# Patient Record
Sex: MALE | Race: WHITE | Employment: FULL TIME | URBAN - METROPOLITAN AREA
[De-identification: names, ages, dates, MRNs, and addresses within clinical notes are randomized per-mention and may not be internally consistent; named-entity substitution may affect disease eponyms.]

---

## 2019-05-06 ENCOUNTER — TELEPHONE (OUTPATIENT)
Dept: PRIMARY CARE CLINIC | Age: 24
End: 2019-05-06

## 2019-05-30 ENCOUNTER — OFFICE VISIT (OUTPATIENT)
Dept: FAMILY MEDICINE CLINIC | Age: 24
End: 2019-05-30
Payer: COMMERCIAL

## 2019-05-30 VITALS
RESPIRATION RATE: 22 BRPM | TEMPERATURE: 98.8 F | WEIGHT: 253 LBS | HEART RATE: 88 BPM | BODY MASS INDEX: 34.27 KG/M2 | HEIGHT: 72 IN

## 2019-05-30 DIAGNOSIS — H66.002 ACUTE SUPPURATIVE OTITIS MEDIA OF LEFT EAR WITHOUT SPONTANEOUS RUPTURE OF TYMPANIC MEMBRANE, RECURRENCE NOT SPECIFIED: Primary | ICD-10-CM

## 2019-05-30 PROCEDURE — 99213 OFFICE O/P EST LOW 20 MIN: CPT | Performed by: FAMILY MEDICINE

## 2019-05-30 RX ORDER — CLARITHROMYCIN 500 MG/1
500 TABLET, COATED ORAL 2 TIMES DAILY
Qty: 14 TABLET | Refills: 0 | Status: SHIPPED | OUTPATIENT
Start: 2019-05-30 | End: 2019-06-06

## 2019-05-30 NOTE — PROGRESS NOTES
19  Italo Lopez : 1995 Sex: male  Age: 21 y.o. Chief Complaint   Patient presents with    Sinusitis     x1week    Congestion     x1week    Cough     x1week       HPI: : URI  The patient states that they have had rhinorrhea, cough, congestion for the last 1 days. Patient is non-verbal. Has not been complaining of any pain. Patient has been more irritable at night. Cough is productive with sputum. The patient also reports nasal congestion and mild sore throat. Positive for nasal discharge. The patient has had general malaise. No fever or chills but has felt warm at times. Patient does admit to chest discomfort with coughing. No dyspnea. No vomiting or diarrhea. The patient presents for evaluation. ROS:  Const: Positives and pertinent negatives as per HPI. All others reviewed and are negative.         Current Outpatient Medications:     clarithromycin (BIAXIN) 500 MG tablet, Take 1 tablet by mouth 2 times daily for 7 days, Disp: 14 tablet, Rfl: 0    acetaminophen (APAP EXTRA STRENGTH) 500 MG tablet, Take 2 tablets by mouth every 6 hours as needed for Pain, Disp: 120 tablet, Rfl: 3    fluticasone (FLONASE) 50 MCG/ACT nasal spray, 1 spray by Nasal route as needed for Rhinitis, Disp: , Rfl:     Multiple Vitamins-Minerals (THERAPEUTIC MULTIVITAMIN-MINERALS) tablet, Take 1 tablet by mouth daily Last dose 2017, Disp: , Rfl:     cetirizine (ZYRTEC) 10 MG tablet, Take 10 mg by mouth daily, Disp: , Rfl:   Allergies   Allergen Reactions    Cefzil [Cefprozil]     Dust Mite Extract     Lac Bovis     Lactose Intolerance (Gi)     Strattera  [Atomoxetine Hcl]        Past Medical History:   Diagnosis Date    Autism     Cerumen impaction     PONV (postoperative nausea and vomiting)      Past Surgical History:   Procedure Laterality Date    EAR EXAM UNDER GENERAL ANESTHESIA Bilateral 2017    evaluation with cerumen removal    MYRINGOTOMY AND TYMPANOSTOMY TUBE PLACEMENT No family history on file. Social History     Socioeconomic History    Marital status: Single     Spouse name: Not on file    Number of children: Not on file    Years of education: Not on file    Highest education level: Not on file   Occupational History    Not on file   Social Needs    Financial resource strain: Not on file    Food insecurity:     Worry: Not on file     Inability: Not on file    Transportation needs:     Medical: Not on file     Non-medical: Not on file   Tobacco Use    Smoking status: Never Smoker   Substance and Sexual Activity    Alcohol use: No    Drug use: Not on file    Sexual activity: Not on file   Lifestyle    Physical activity:     Days per week: Not on file     Minutes per session: Not on file    Stress: Not on file   Relationships    Social connections:     Talks on phone: Not on file     Gets together: Not on file     Attends [employer] service: Not on file     Active member of club or organization: Not on file     Attends meetings of clubs or organizations: Not on file     Relationship status: Not on file    Intimate partner violence:     Fear of current or ex partner: Not on file     Emotionally abused: Not on file     Physically abused: Not on file     Forced sexual activity: Not on file   Other Topics Concern    Not on file   Social History Narrative    Not on file         Vitals:    05/30/19 0908   Pulse: 88   Resp: 22   Temp: 98.8 °F (37.1 °C)   Weight: 253 lb (114.8 kg)   Height: 6' (1.829 m)       Exam:  Physical Exam   Constitutional: He appears well-developed. HENT:   Head: Normocephalic. Right Ear: Tympanic membrane normal.   Left Ear: Tympanic membrane is injected and erythematous. A middle ear effusion is present. Nose: Mucosal edema and rhinorrhea present. Patient uncooperative and unable follow commands to open mouth. Eyes: Pupils are equal, round, and reactive to light.  Conjunctivae are normal.   Cardiovascular: Normal rate, regular rhythm and normal heart sounds. No murmur heard. Pulmonary/Chest: Effort normal and breath sounds normal. He has no wheezes. He has no rales. Abdominal: Soft. Bowel sounds are normal.   Lymphadenopathy:     He has no cervical adenopathy. Neurological: He is alert. Psychiatric: He has a normal mood and affect. Assessment and Plan:  Lori Savage was seen today for sinusitis, congestion and cough. Diagnoses and all orders for this visit:    Acute suppurative otitis media of left ear without spontaneous rupture of tympanic membrane, recurrence not specified    Other orders  -     clarithromycin (BIAXIN) 500 MG tablet; Take 1 tablet by mouth 2 times daily for 7 days      Return in about 1 week (around 6/6/2019), or w/PCP. The above treatment regimen was discussed at length and all questions in regards to such were answered. Antibiotics were reviewed including common side effects and rare side effects including but not limited to C. diff infection. Patient is to proceed to the emergency department with any worsening symptoms. Patient should follow-up with her family doctor within the next 3-5 days.     Seen By:   Tae Robbins MD

## 2019-09-23 RX ORDER — FLUTICASONE PROPIONATE 50 MCG
SPRAY, SUSPENSION (ML) NASAL
Qty: 1 BOTTLE | Refills: 3 | Status: SHIPPED
Start: 2019-09-23 | End: 2020-06-16 | Stop reason: SDUPTHER

## 2020-06-16 RX ORDER — FLUTICASONE PROPIONATE 50 MCG
SPRAY, SUSPENSION (ML) NASAL
Qty: 1 BOTTLE | Refills: 3 | Status: SHIPPED
Start: 2020-06-16 | End: 2021-03-23 | Stop reason: SDUPTHER

## 2021-03-23 ENCOUNTER — OFFICE VISIT (OUTPATIENT)
Dept: PRIMARY CARE CLINIC | Age: 26
End: 2021-03-23
Payer: COMMERCIAL

## 2021-03-23 VITALS — HEIGHT: 72 IN | RESPIRATION RATE: 18 BRPM | BODY MASS INDEX: 38.74 KG/M2 | WEIGHT: 286 LBS | TEMPERATURE: 97.2 F

## 2021-03-23 DIAGNOSIS — F84.0 ACTIVE AUTISTIC DISORDER: ICD-10-CM

## 2021-03-23 DIAGNOSIS — H61.23 BILATERAL IMPACTED CERUMEN: ICD-10-CM

## 2021-03-23 DIAGNOSIS — J31.0 CHRONIC RHINITIS: ICD-10-CM

## 2021-03-23 DIAGNOSIS — Z00.00 HEALTH MAINTENANCE EXAMINATION: Primary | ICD-10-CM

## 2021-03-23 PROCEDURE — 99395 PREV VISIT EST AGE 18-39: CPT | Performed by: FAMILY MEDICINE

## 2021-03-23 RX ORDER — FLUTICASONE PROPIONATE 50 MCG
SPRAY, SUSPENSION (ML) NASAL
Qty: 1 BOTTLE | Refills: 6 | Status: SHIPPED
Start: 2021-03-23 | End: 2022-04-05 | Stop reason: SDUPTHER

## 2021-03-23 ASSESSMENT — PATIENT HEALTH QUESTIONNAIRE - PHQ9: DEPRESSION UNABLE TO ASSESS: FUNCTIONAL CAPACITY MOTIVATION LIMITS ACCURACY

## 2021-03-23 NOTE — PROGRESS NOTES
3/23/21  Hannah Divers : 1995 Sex: male  Age: 22 y.o. Chief Complaint   Patient presents with    Annual Exam       HPI:   Rizwana Quijano presents today with his mom/guardian. In baseline state of health. Has forms in a completed. Chronic cerumen. Has seen ENT. Review of Systems negative in general, nonverbal  ROS:  Const: Denies chills, fever, malaise and sweats. Eyes: Denies discharge, pain, redness and visual disturbance. ENMT: Denies earaches, other ear symptoms. Denies nasal or sinus symptoms other than stated  above. Denies mouth and tongue lesions and sore throat. CV: Denies chest discomfort, pain; diaphoresis, dizziness, edema, lightheadedness, orthopnea,  palpitations, syncope and near syncopal episode or any exertional symptoms  Resp: Denies cough, hemoptysis, pleuritic pain, SOB, sputum production and wheezing. GI: Denies abdominal pain, change in bowel habits, hematochezia, melena, nausea and vomiting. : Denies urinary symptoms including dysuria , urgency, frequency or hematuria. Musculo: Denies musculoskeletal symptoms. Skin: Denies bruising and rash. Neuro: Denies headache, numbness, stiff neck, tingling and focal weakness slurred speech or facial  droop  Hema/Lymph: Denies bleeding/bruising tendency and enlarged lymph nodes      Health Maintenance:  Proper diet reviewed including Mediterranean and DASH diets. Counseled on healthy weight, appropriate exercise. Counseled on the potential pros and cons of vitamins and supplements. Reviewed the recommendations and risk/benefits of vaccines including Td/Tdap,  pneumovax,  prevnar 13 , flu vaccine, Hepatitis vaccines, gardasil, and shingrix (patient had chicken pox). Intolerant to the administration vaccines and so defers. HIV and Hep C screening guidelines were reviewed. Intolerant to blood work and so defers. Importance of regular eye and dental exams and health reviewed. Sun protection reviewed.  Notify if any new or changing moles/skin lesions, etc.       Indications for EKG and indications for additional  cardiac testing including referrals, stress testing,  2d echo, ect. dasx  Reviewed indications for other testing such as  PFT's.and  indications for imaging including brain, carotid, chest, abdominal, aortic .  dasx     Current Outpatient Medications:     fluticasone (FLONASE) 50 MCG/ACT nasal spray, 1 spray each nostril qd prn, Disp: 1 Bottle, Rfl: 6    acetaminophen (APAP EXTRA STRENGTH) 500 MG tablet, Take 2 tablets by mouth every 6 hours as needed for Pain, Disp: 120 tablet, Rfl: 3    Multiple Vitamins-Minerals (THERAPEUTIC MULTIVITAMIN-MINERALS) tablet, Take 1 tablet by mouth daily Last dose 8/14/2017, Disp: , Rfl:     cetirizine (ZYRTEC) 10 MG tablet, Take 10 mg by mouth daily, Disp: , Rfl:      Allergies   Allergen Reactions    Cefzil [Cefprozil]     Dust Mite Extract     Lac Bovis     Lactose Intolerance (Gi)     Strattera  [Atomoxetine Hcl]        Past Medical History:   Diagnosis Date    Autism     Cerumen impaction     PONV (postoperative nausea and vomiting)      Past Surgical History:   Procedure Laterality Date    EAR EXAM UNDER GENERAL ANESTHESIA Bilateral 08/24/2017    evaluation with cerumen removal    MYRINGOTOMY AND TYMPANOSTOMY TUBE PLACEMENT       No family history on file. Social History     Tobacco Use    Smoking status: Never Smoker    Smokeless tobacco: Never Used   Substance Use Topics    Alcohol use: No    Drug use: Not on file      Social History     Social History Narrative    PMH:    Medical Problems:    Autism - Has followed with Developmental specialists, Dr Carlos Shaw. Has tried a number of meds    including ritalin. Was part of experimental study for autism (vegetable enzyme) - pt allergic. Has    been to [facility]. Previously  ST/OT. Does not qualify for PT.  Nonverbal. Uses PEC (picture exchange    system)    PDD - pervasive developmental diagnosis    Surgical Hx:    tymp tubes - 4sets. Follows with Dr Ngozi Hidalgo. FH:    Father:    . (Hx)    Mother:    . (Hx)    Denies    SH:    . (Marital)Mom, Dad, Sister (3yrs older)    Personal Habits: Cigarette Use: Never Smoked Cigarettes. Vitals:    03/23/21 1510   BP: Comment: unable to get reading   Pulse: Comment: unable to get reading   Resp: 18   Temp: 97.2 °F (36.2 °C)   TempSrc: Temporal   SpO2: Comment: unable to get reading   Weight: 286 lb (129.7 kg)   Height: 6' (1.829 m)       Physical Exam  Exam:  Const: Appears comfortable. No signs of acute distress present. Head/Face: Atraumatic on inspection. Eyes: EOMI in both eyes. No discharge from the eyes. PERRL. Sclerae clear. ENMT: Auditory canals reveal mild bilateral cerumen tympanic membranes: intact and translucent. External nose WNL. Nasal mucosa is clear. Oropharynx: No erythema or exudate. Posterior pharynx is normal.  Neck: Supple. Palpation reveals no adenopathy. No masses appreciated. No JVD. Carotids: no  bruits. Resp: Respirations are unlabored. Clear to auscultation. No rales, rhonchi or wheezes appreciated  over the lungs bilaterally. CV: Rate is regular. Rhythm is regular. No gallop or rubs. No heart murmur appreciated. Extremities: No clubbing, cyanosis, or edema. No calf inflammation or tenderness. Abdomen: Bowel sounds are normoactive. Abdomen is soft, nontender, and nondistended. No  abdominal masses. No palpable hepatosplenomegaly. Genitaliadeferred  Lymph: No palpable or visible regional lymphadenopathy. Musculoskeletal: no acute joint inflammation. Skin: Dry and warm with no rash. Skin normal to inspection and palpation overall. Neuro: Grossly intact. Difficult to do involved exam    Office Labs This Visit :  No results found for this visit on 03/23/21. Assessment and Plan:   Diagnosis Orders   1. Health maintenance examination     2. Bilateral impacted cerumen     3. Active autistic disorder     4.  Chronic rhinitis  fluticasone (FLONASE) 50 MCG/ACT nasal spray          Health maintenance examination  Health maintenance issues discussed at length as above, 3/21. Encourage yearly    Active autistic disorder  History of. Nonverbal.  Needs guardianship. Mom doing an excellent job    Bilateral impacted cerumen  History of. Has seen Dr. Omar Simpson. We did discuss appropriate use of Debrox and bulb irrigation. R/B reviewed     Allergic rhinitis  Counseled, does well with Flonase 1 spray each nostril daily as needed and Zyrtec 10 mg daily as needed. Mom asking for refill on Flonase. Plan as above. Counseled extensively and differential diagnoses relevant to above were reviewed, including serious etiologies. Side effects and interactions of medications were reviewed. Counseled. Intolerant to administration of vaccines, blood work and so mom would like to stay conservative. Seems in baseline health. Forms completed. They will plan yearly follow-up for physical sooner as needed        As long as symptoms steadily improve/resolve, and medical conditions follow the expected course, FU as below, sooner PRN. Return in about 1 year (around 3/23/2022), or if symptoms worsen or fail to improve, for physical.        Signs and symptoms to watch for discussed, serious signs and symptoms reviewed. ER if any. Honey Zaman MD    Patients are advised to check with insurance company to ensure coverage and to fully understand benefits and cost prior to any testing. This note was created with the assistance of voice recognition software. Document was reviewed however may contain grammatical errors.

## 2021-03-23 NOTE — ASSESSMENT & PLAN NOTE
Counseled, does well with Flonase 1 spray each nostril daily as needed and Zyrtec 10 mg daily as needed. Mom asking for refill on Flonase.

## 2021-03-23 NOTE — ASSESSMENT & PLAN NOTE
History of. Has seen Dr. Velasco Come. We did discuss appropriate use of Debrox and bulb irrigation.   R/B reviewed

## 2021-04-22 PROBLEM — Z00.00 HEALTH MAINTENANCE EXAMINATION: Status: RESOLVED | Noted: 2021-03-23 | Resolved: 2021-04-22

## 2021-11-09 ENCOUNTER — TELEPHONE (OUTPATIENT)
Dept: PRIMARY CARE CLINIC | Age: 26
End: 2021-11-09

## 2021-12-08 ENCOUNTER — OFFICE VISIT (OUTPATIENT)
Dept: FAMILY MEDICINE CLINIC | Age: 26
End: 2021-12-08
Payer: COMMERCIAL

## 2021-12-08 VITALS
RESPIRATION RATE: 20 BRPM | HEART RATE: 121 BPM | WEIGHT: 286 LBS | OXYGEN SATURATION: 96 % | HEIGHT: 72 IN | BODY MASS INDEX: 38.74 KG/M2 | TEMPERATURE: 101.6 F

## 2021-12-08 DIAGNOSIS — U07.1 COVID-19 VIRUS DETECTED: Primary | ICD-10-CM

## 2021-12-08 LAB
Lab: ABNORMAL
QC PASS/FAIL: ABNORMAL
SARS-COV-2 RDRP RESP QL NAA+PROBE: POSITIVE

## 2021-12-08 PROCEDURE — 99213 OFFICE O/P EST LOW 20 MIN: CPT | Performed by: PHYSICIAN ASSISTANT

## 2021-12-08 PROCEDURE — 87635 SARS-COV-2 COVID-19 AMP PRB: CPT | Performed by: PHYSICIAN ASSISTANT

## 2021-12-08 RX ORDER — AZITHROMYCIN 250 MG/1
250 TABLET, FILM COATED ORAL SEE ADMIN INSTRUCTIONS
Qty: 6 TABLET | Refills: 0 | Status: SHIPPED | OUTPATIENT
Start: 2021-12-08 | End: 2021-12-13

## 2021-12-08 NOTE — PROGRESS NOTES
21  Misha Mckeon : 1995 Sex: male  Age 32 y.o. Subjective:  Chief Complaint   Patient presents with    Cough     covid         HPI:   Misha Mckeon , 32 y.o. male presents to [facility] care for evaluation of cough, Covid    HPI  63-year-old male presents to [facility] for evaluation of cough, fever, and at home positive Covid test.  Patient is here with mother and father. The patient is nonverbal.  The patient has not had the vaccine. The patient has had 1 or 2 times at home. The patient is not currently on any antibiotics. Patient has not had the vaccine nor booster. ROS:   Unless otherwise stated in this report the patient's positive and negative responses for review of systems for constitutional, eyes, ENT, cardiovascular, respiratory, gastrointestinal, neurological, , musculoskeletal, and integument systems and related systems to the presenting problem are either stated in the history of present illness or were not pertinent or were negative for the symptoms and/or complaints related to the presenting medical problem. Positives and pertinent negatives as per HPI. All others reviewed and are negative. PMH:     Past Medical History:   Diagnosis Date    Autism     Cerumen impaction     PONV (postoperative nausea and vomiting)        Past Surgical History:   Procedure Laterality Date    EAR EXAM UNDER GENERAL ANESTHESIA Bilateral 2017    evaluation with cerumen removal    MYRINGOTOMY AND TYMPANOSTOMY TUBE PLACEMENT         History reviewed. No pertinent family history.     Medications:     Current Outpatient Medications:     azithromycin (ZITHROMAX) 250 MG tablet, Take 1 tablet by mouth See Admin Instructions for 5 days 500mg on day 1 followed by 250mg on days 2 - 5, Disp: 6 tablet, Rfl: 0    fluticasone (FLONASE) 50 MCG/ACT nasal spray, 1 spray each nostril qd prn, Disp: 1 Bottle, Rfl: 6    acetaminophen (APAP EXTRA STRENGTH) 500 MG tablet, Take 2 Results for orders placed or performed in visit on 12/08/21   POCT COVID-19, Rapid   Result Value Ref Range    SARS-COV-2, RdRp gene Positive (A) Negative    Lot Number [id number]     QC Pass/Fail Pass          Medical Decision Making:     Vital signs reviewed    Past medical history reviewed. Allergies reviewed. Medications reviewed. Patient on arrival does not appear to be in any apparent distress or discomfort. The patient has been seen and evaluated. The patient does not appear to be toxic or lethargic. The patient did have a rapid Covid test that was positive. The patient will be treated with a azithromycin. They do not feel that the patient would tolerate the infusion therapy. COVID-19 was detected as positive. We will have the patient quarantine, isolate. Call with any questions or concerns. Return if any of the signs or symptoms change or worsen for further evaluation and possible imaging/chest x-ray. Continue with vitamin regimen, fluids, rest.  Use Motrin, Tylenol. Monitor pulse ox (less than 92% go to ED). Follow-up with PCP or return to [facility] for evaluation. If symptoms worsen go directly to the ED    The patient was educated on the proper dosage of motrin and tylenol and the appropriate intervals of each. The patient is to increase fluid intake over the next several days. The patient is to use OTC decongestant as needed. The patient is to return to express care or go directly to the emergency department should any of the signs or symptoms worsen. The patient is to followup with primary care physician in 2-3 days for repeat evaluation. The patient has no other questions or concerns at this time the patient will be discharged home. Clinical Impression:   Leigh Fraser was seen today for cough. Diagnoses and all orders for this visit:    COVID-19 virus detected  -     POCT COVID-19, Rapid  -     azithromycin (ZITHROMAX) 250 MG tablet;  Take 1 tablet by mouth See Admin Instructions for 5 days 500mg on day 1 followed by 250mg on days 2 - 5        The patient is to call for any concerns or return if any of the signs or symptoms worsen. The patient is to follow-up with PCP in the next 2-3 days for repeat evaluation repeat assessment or go directly to the emergency department.      SIGNATURE: Alban Cooper III, PA-C

## 2021-12-13 ENCOUNTER — TELEPHONE (OUTPATIENT)
Dept: PRIMARY CARE CLINIC | Age: 26
End: 2021-12-13

## 2021-12-13 ENCOUNTER — APPOINTMENT (OUTPATIENT)
Dept: GENERAL RADIOLOGY | Age: 26
End: 2021-12-13
Payer: COMMERCIAL

## 2021-12-13 ENCOUNTER — HOSPITAL ENCOUNTER (EMERGENCY)
Age: 26
Discharge: HOME | End: 2021-12-13
Attending: EMERGENCY MEDICINE
Payer: COMMERCIAL

## 2021-12-13 VITALS
DIASTOLIC BLOOD PRESSURE: 82 MMHG | WEIGHT: 260 LBS | SYSTOLIC BLOOD PRESSURE: 140 MMHG | HEART RATE: 115 BPM | BODY MASS INDEX: 35.21 KG/M2 | HEIGHT: 72 IN | OXYGEN SATURATION: 93 % | TEMPERATURE: 100 F | RESPIRATION RATE: 20 BRPM

## 2021-12-13 DIAGNOSIS — U07.1 COVID-19: Primary | ICD-10-CM

## 2021-12-13 DIAGNOSIS — F84.0 ACTIVE AUTISTIC DISORDER: ICD-10-CM

## 2021-12-13 LAB
EKG ATRIAL RATE: 111 BPM
EKG P-R INTERVAL: 136 MS
EKG Q-T INTERVAL: 292 MS
EKG QRS DURATION: 74 MS
EKG QTC CALCULATION (BAZETT): 397 MS
EKG R AXIS: -9 DEGREES
EKG T AXIS: -17 DEGREES
EKG VENTRICULAR RATE: 111 BPM

## 2021-12-13 PROCEDURE — 6370000000 HC RX 637 (ALT 250 FOR IP): Performed by: EMERGENCY MEDICINE

## 2021-12-13 PROCEDURE — 71046 X-RAY EXAM CHEST 2 VIEWS: CPT

## 2021-12-13 PROCEDURE — 93010 ELECTROCARDIOGRAM REPORT: CPT | Performed by: INTERNAL MEDICINE

## 2021-12-13 PROCEDURE — 93005 ELECTROCARDIOGRAM TRACING: CPT | Performed by: PHYSICIAN ASSISTANT

## 2021-12-13 PROCEDURE — 6370000000 HC RX 637 (ALT 250 FOR IP): Performed by: PHYSICIAN ASSISTANT

## 2021-12-13 PROCEDURE — 6360000002 HC RX W HCPCS: Performed by: EMERGENCY MEDICINE

## 2021-12-13 PROCEDURE — 99284 EMERGENCY DEPT VISIT MOD MDM: CPT

## 2021-12-13 RX ORDER — DIAZEPAM 5 MG/1
5 TABLET ORAL EVERY 6 HOURS PRN
Qty: 2 TABLET | Refills: 0 | Status: SHIPPED | OUTPATIENT
Start: 2021-12-13 | End: 2021-12-15

## 2021-12-13 RX ORDER — DEXAMETHASONE SODIUM PHOSPHATE 10 MG/ML
10 INJECTION INTRAMUSCULAR; INTRAVENOUS ONCE
Status: COMPLETED | OUTPATIENT
Start: 2021-12-13 | End: 2021-12-13

## 2021-12-13 RX ORDER — DEXAMETHASONE SODIUM PHOSPHATE 10 MG/ML
INJECTION INTRAMUSCULAR; INTRAVENOUS
Status: DISCONTINUED
Start: 2021-12-13 | End: 2021-12-13 | Stop reason: HOSPADM

## 2021-12-13 RX ORDER — ACETAMINOPHEN 500 MG
1000 TABLET ORAL ONCE
Status: COMPLETED | OUTPATIENT
Start: 2021-12-13 | End: 2021-12-13

## 2021-12-13 RX ORDER — DOXYCYCLINE 100 MG/1
100 CAPSULE ORAL 2 TIMES DAILY
Qty: 20 CAPSULE | Refills: 0 | Status: SHIPPED | OUTPATIENT
Start: 2021-12-13 | End: 2021-12-23

## 2021-12-13 RX ORDER — DEXAMETHASONE 6 MG/1
6 TABLET ORAL 2 TIMES DAILY WITH MEALS
Qty: 20 TABLET | Refills: 0 | Status: SHIPPED | OUTPATIENT
Start: 2021-12-13 | End: 2021-12-23

## 2021-12-13 RX ORDER — DOXYCYCLINE HYCLATE 100 MG/1
100 CAPSULE ORAL ONCE
Status: COMPLETED | OUTPATIENT
Start: 2021-12-13 | End: 2021-12-13

## 2021-12-13 RX ADMIN — ACETAMINOPHEN 1000 MG: 500 TABLET ORAL at 12:13

## 2021-12-13 RX ADMIN — DOXYCYCLINE HYCLATE 100 MG: 100 CAPSULE ORAL at 12:13

## 2021-12-13 RX ADMIN — DEXAMETHASONE SODIUM PHOSPHATE 10 MG: 10 INJECTION INTRAMUSCULAR; INTRAVENOUS at 12:13

## 2021-12-13 ASSESSMENT — PAIN SCALES - GENERAL: PAINLEVEL_OUTOF10: 5

## 2021-12-13 NOTE — TELEPHONE ENCOUNTER
The pt's mom is calling to let you know that the pt's oxygen dropped to 89 so she is taking him to the ED right now

## 2021-12-13 NOTE — ED NOTES
Department of Emergency Medicine  FIRST PROVIDER TRIAGE NOTE             Independent MLP           12/13/21  10:38 AM EST    Date of Encounter: 12/13/21   MRN: [id number]      HPI: Scott Ansari is a 32 y.o. male who presents to the ED for No chief complaint on file. Pt presenting with covid + and pulse ox of 89%. Tested + 5 days ago. Pt is nonverbal. Pt parents denies him to appear to be in any distress or sob. Pt parents states he will not let blood be drawn unless sedated. ROS: Negative for sob, fever, cough, vomiting or diarrhea. PE: Gen Appearance/Constitutional: alert  HEENT: NC/NT. PERRLA,  Airway patent. Initial Plan of Care: All treatment areas with department are currently occupied. Plan to order/Initiate the following while awaiting opening in ED: labs, EKG and imaging studies.     Initial Plan of Care: Initiate Treatment-Testing, Proceed toTreatment Area When Bed Available for ED Attending/MLP to Continue Care    Electronically signed by Shelli Santana PA-C   DD: 12/13/21       Shelli Santana PA-C  12/13/21 [address] [place], [place] [address]RADHA  12/13/21 8620

## 2021-12-13 NOTE — ED NOTES
Bed: 16  Expected date:   Expected time:   Means of arrival:   Comments:  Triage     Arvind Braswell RN  12/13/21 1463

## 2021-12-13 NOTE — ED PROVIDER NOTES
Department of Emergency Medicine   ED  Provider Note  Admit Date/RoomTime: 12/13/2021 10:57 AM  ED Room: 16/16          History of Present Illness:  12/13/21, Time: 11:54 AM EST  Chief Complaint   Patient presents with    Positive For Covid-19     tested + for covid 5 days ago Family states SaO2 89% at home                Ani Ivan is a 32 y.o. male presenting to the ED for recent diagnosis of COVID-19 low pulse ox, beginning a few hours. The complaint has been intermittent, mild in severity, and worsened by nothing. Patient presents for pulse oximetry reading of 89% at home recent diagnosis COVID-19. Not vaccinated, tested positive COVID-19 last week. Has been on azithromycin and vitamins. History of autism, history largely came from patient's family, they state he still been drinking normally but has not been eating as much. They thought he was showing some signs of clinical improvement, this morning he had a pulse oximetry reading of 89% and they brought him into the emergency department. Upon arrival here is between 90 to 94%. They spoke with his PCP regarding possible monoclonal antibody infusion, patient normally needs sedation for exam and blood work, they did not believe appropriate for outpatient antibody infusion direct him to the emergency department. ENCOUNTER LIMITATION:    Please note that the HPI, ROS, Past History, and Physical Examination are limited due to this patients mental status. Review of Systems:   A complete review of systems was unable to be performed secondary to the limitations noted above            --------------------------------------------- PAST HISTORY ---------------------------------------------  Past Medical History:  has a past medical history of Autism, Cerumen impaction, and PONV (postoperative nausea and vomiting).     Past Surgical History:  has a past surgical history that includes Myringotomy Tympanostomy Tube Placement and Ear Exm Under Gen Anesth (Bilateral, 08/24/2017). Social History:  reports that he has never smoked. He has never used smokeless tobacco. He reports that he does not drink alcohol. Family History: family history is not on file. . Unless otherwise noted, family history is non contributory    The patients home medications have been reviewed. Allergies: Cefzil [cefprozil], Dust mite extract, Lac bovis, Lactose intolerance (gi), and Strattera  [atomoxetine hcl]        ---------------------------------------------------PHYSICAL EXAM--------------------------------------    Constitutional/General: Alert and oriented baseline per parents  Head: Normocephalic and atraumatic  Eyes: PERRL, EOMI, sclera non icteric  Mouth: Oropharynx clear, handling secretions, no trismus, no asymmetry of the posterior oropharynx or uvular edema  Neck: Supple, full ROM, no stridor, no meningeal signs  Respiratory: L diminished throughout,Not in respiratory distress  Cardiovascular: Tachycardic and regular 2+ distal pulses. Equal extremity pulses. Chest: No chest wall tenderness  GI:  Abdomen Soft, Non tender, Non distended. No rebound, guarding, or rigidity. Musculoskeletal: Moves all extremities x 4. Warm and well perfused, . Capillary refill <3 seconds  Integument: skin warm and dry. No rashes. Neurologic:  at baseline per parents  Psychiatric: Normal Affect         -------------------------------------------------- RESULTS -------------------------------------------------  I have personally reviewed all laboratory and imaging results for this patient. Results are listed below.      LABS: (Lab results interpreted by me)  Results for orders placed or performed during the hospital encounter of 12/13/21   EKG 12 Lead   Result Value Ref Range    Ventricular Rate 111 BPM    Atrial Rate 111 BPM    P-R Interval 136 ms    QRS Duration 74 ms    Q-T Interval 292 ms    QTc Calculation (Bazett) 397 ms    R Axis -9 degrees    T Axis -17 degrees   , RADIOLOGY:  Interpreted by Radiologist unless otherwise specified  XR CHEST (2 VW)   Final Result   1. Multifocal bilateral pneumonia more prominent within the right upper lobe.                          ------------------------- NURSING NOTES AND VITALS REVIEWED ---------------------------   The nursing notes within the ED encounter and vital signs as below have been reviewed by myself  BP (!) 140/82 Comment: manual  Pulse 115   Temp 100 °F (37.8 °C) (Temporal)   Resp 20   Ht 6' (1.829 m)   Wt 260 lb (117.9 kg)   SpO2 93%   BMI 35.26 kg/m²     Oxygen Saturation Interpretation: Normal         The patients available past medical records and past encounters were reviewed. ------------------------------ ED COURSE/MEDICAL DECISION MAKING----------------------  Medications   dexamethasone (DECADRON) 10 MG/ML injection (has no administration in time range)   acetaminophen (TYLENOL) tablet 1,000 mg (1,000 mg Oral Given 12/13/21 1213)   dexamethasone (DECADRON) injection 10 mg (10 mg Oral Given 12/13/21 1213)   doxycycline hyclate (VIBRAMYCIN) capsule 100 mg (100 mg Oral Given 12/13/21 1213)         EKG: This EKG is signed and interpreted by me. Rate: 111  Rhythm: Sinus  Interpretation: Sinus rhythm with sinus tachycardia, MO is 136, QRS is 74, QTc is 397, nonspecific T changes without prior for comparison  Comparison: no previous EKG available             Medical Decision Making:     I, Dr. Slade Floyd am the primary provider of record    Patient with outpatient diagnosis of COVID-19. Had single reading of 89% on room air earlier this morning, between 92 and 94% here. Parents state patient has history of autism disorder and frequent require sedation for medical procedures. Spoke with pharmacy here, ED infusion of multiple antibodies not yet active at this facility. Attempted to reach out to infusion center to discuss coordination however unable to arrange coordination.  Discussion with the family, they stated he did well taking medications here having his blood pressure checked an EKG obtained, they would be agreeable to trying to schedule outpatient antibody infusion but request anxiety lysis prior to infusion. A formal outpatient medical antibody fusion has been ordered we will order Valium to be given prior to infusion, will add Decadron and doxycycline, discharge home with outpatient follow-up have return for seen signs or symptoms      Re-Evaluations:          Re-evaluation. Patients symptoms are improving  Repeat physical examination is improved        This patient's ED course included: a personal history and physicial examination, re-evaluation prior to disposition and complex medical decision making and emergency management    This patient has remained hemodynamically stable during their ED course. Counseling: The emergency provider has spoken with the patient and mother and father and discussed todays results, in addition to providing specific details for the plan of care and counseling regarding the diagnosis and prognosis. Questions are answered at this time and they are agreeable with the plan.       --------------------------------- IMPRESSION AND DISPOSITION ---------------------------------    IMPRESSION  1. COVID-19    2. Active autistic disorder        DISPOSITION  Disposition: Discharge to home  Patient condition is stable        NOTE: This report was transcribed using voice recognition software.  Every effort was made to ensure accuracy; however, inadvertent computerized transcription errors may be present       Yolanda Mckeon DO  12/13/21 9665

## 2021-12-13 NOTE — ED NOTES
Pt is significantly mentally challanged will not allow name band to be placed, will not wear mask, will not allow BP      Candie Samuel, RN  12/13/21 [id number]

## 2021-12-14 ENCOUNTER — CARE COORDINATION (OUTPATIENT)
Dept: CARE COORDINATION | Age: 26
End: 2021-12-14

## 2021-12-14 NOTE — CARE COORDINATION
ACM attempted to get information about Monoclonal Infusion via the ER  After 2 attempts and multiple transfers, no information was obtained.

## 2021-12-14 NOTE — CARE COORDINATION
ACCHARISMA received call from Viviana Samson, Manager of Oncology Services. She is reaching out to Dr Asim Daugherty for direction.

## 2021-12-14 NOTE — CARE COORDINATION
Pt's father states that wife spoke with Kade Keller ( for Monoclonal infusion in [facility]). States appointment was going to be scheduled for infusion at 10:30am tomorrow, but was not after it was disclosed that pt is 'special needs and cannot wear a mask'. There are patients in this center that are very high risk, so a mask is mandatory. This ACM reached out to [address] to ask for direction. This ACM called Fiona at [phone] and confirmed the information above. Kade Keller is going to check with nursing supervisor to see if there is any alternative for therapy and call ACM back. Pt's 10 day window ends Thursday evening per pt's father.

## 2021-12-15 NOTE — TELEPHONE ENCOUNTER
I'm confused. I thought ER ordered COVID AB infusion and sedation. Bernadette Buchanan do they need something from me??? I do not want to miss the 10 day window. ... Bernadette Buchanan   please let me know

## 2021-12-15 NOTE — CARE COORDINATION
Spoke with Rod Jules in [facility] ER. She spoke with the Clinical pharmacist and the attending. Suggestion made to contact guardian and collaborate with PCP re: pt coming up on day 10, risk of side effects, allergic reactions, sedation for a patient that seems to be doing better.   ACM to route note to PCP   ACM to call pt's guardian to update

## 2021-12-15 NOTE — CARE COORDINATION
Pt's dad called ACM  States he seems like his activity level is lower  Pulse Ox is 93%  He is inquiring about the status of the Monoclonal Infusion. I reiterated the message I left for him to collaborate with the PCP. He was also advised that if he felt the patient was becoming worse that he could return to the ER. Verbalized understanding.

## 2021-12-15 NOTE — CARE COORDINATION
Spoke with Milad Fleming at PCP office. Updated with current information and that AC was routing to PCP   Milad Fleming states she will make sure PCP sees message.

## 2021-12-15 NOTE — CARE COORDINATION
LVM for Guardian re: Information received from Charge nurse Milind Pierre) in [facility] in earlier note.

## 2021-12-15 NOTE — CARE COORDINATION
Pt's father called NEIL concerned that the 10 day window for the Monoclonal Antibody infusion will end after tomorrow. He was wanting an update. Pt's dad is not sure valium will be enough, because they have never tried it before. He's never had an IV before or any type of invasive hospital treatment. States BP's are even difficult. Pt's dad said ER was able to obtain a BP, and dad was surprised. Pt's dad states he is feeling better. Still taking tylenol, but temp has ranged 99.5 and below. Pulse ox: 93-96%. He has a bit of a cough, that seems to be improving (3-4 times per hour). States biggest concern is that because he is special needs and non-verbal, it is difficult to assess recovery. He states that was the ER provider's concern too. NEIL called [place] [facility] and spoke with charge nurse, Renea Morrow. She is familiar with this pt and is going to try to find someone to call this NEIL @ [phone] to discuss.

## 2021-12-16 ENCOUNTER — TELEPHONE (OUTPATIENT)
Dept: PRIMARY CARE CLINIC | Age: 26
End: 2021-12-16

## 2021-12-16 ENCOUNTER — HOSPITAL ENCOUNTER (EMERGENCY)
Age: 26
Discharge: HOME | End: 2021-12-16
Attending: EMERGENCY MEDICINE
Payer: COMMERCIAL

## 2021-12-16 VITALS
DIASTOLIC BLOOD PRESSURE: 78 MMHG | WEIGHT: 260 LBS | OXYGEN SATURATION: 95 % | HEIGHT: 71 IN | BODY MASS INDEX: 36.4 KG/M2 | HEART RATE: 89 BPM | SYSTOLIC BLOOD PRESSURE: 110 MMHG | TEMPERATURE: 97.7 F | RESPIRATION RATE: 16 BRPM

## 2021-12-16 DIAGNOSIS — U07.1 COVID: Primary | ICD-10-CM

## 2021-12-16 PROCEDURE — 6370000000 HC RX 637 (ALT 250 FOR IP): Performed by: PHYSICIAN ASSISTANT

## 2021-12-16 PROCEDURE — 99283 EMERGENCY DEPT VISIT LOW MDM: CPT

## 2021-12-16 RX ORDER — DIAZEPAM 5 MG/1
5 TABLET ORAL ONCE
Status: COMPLETED | OUTPATIENT
Start: 2021-12-16 | End: 2021-12-16

## 2021-12-16 RX ORDER — EPINEPHRINE 1 MG/ML
0.3 INJECTION, SOLUTION, CONCENTRATE INTRAVENOUS PRN
Status: DISCONTINUED | OUTPATIENT
Start: 2021-12-16 | End: 2021-12-16 | Stop reason: HOSPADM

## 2021-12-16 RX ORDER — SODIUM CHLORIDE 9 MG/ML
100 INJECTION, SOLUTION INTRAVENOUS CONTINUOUS PRN
Status: DISCONTINUED | OUTPATIENT
Start: 2021-12-16 | End: 2021-12-16 | Stop reason: HOSPADM

## 2021-12-16 RX ORDER — SODIUM CHLORIDE 0.9 % (FLUSH) 0.9 %
5-40 SYRINGE (ML) INJECTION EVERY 12 HOURS SCHEDULED
Status: DISCONTINUED | OUTPATIENT
Start: 2021-12-16 | End: 2021-12-16 | Stop reason: HOSPADM

## 2021-12-16 RX ORDER — DIPHENHYDRAMINE HYDROCHLORIDE 50 MG/ML
50 INJECTION INTRAMUSCULAR; INTRAVENOUS
Status: DISCONTINUED | OUTPATIENT
Start: 2021-12-16 | End: 2021-12-16 | Stop reason: HOSPADM

## 2021-12-16 RX ORDER — SODIUM CHLORIDE 0.9 % (FLUSH) 0.9 %
5-40 SYRINGE (ML) INJECTION PRN
Status: DISCONTINUED | OUTPATIENT
Start: 2021-12-16 | End: 2021-12-16 | Stop reason: HOSPADM

## 2021-12-16 RX ORDER — METHYLPREDNISOLONE SODIUM SUCCINATE 125 MG/2ML
125 INJECTION, POWDER, LYOPHILIZED, FOR SOLUTION INTRAMUSCULAR; INTRAVENOUS
Status: DISCONTINUED | OUTPATIENT
Start: 2021-12-16 | End: 2021-12-16 | Stop reason: HOSPADM

## 2021-12-16 RX ORDER — SODIUM CHLORIDE 9 MG/ML
20 INJECTION, SOLUTION INTRAVENOUS CONTINUOUS PRN
Status: DISCONTINUED | OUTPATIENT
Start: 2021-12-16 | End: 2021-12-16 | Stop reason: HOSPADM

## 2021-12-16 RX ORDER — SODIUM CHLORIDE 9 MG/ML
25 INJECTION, SOLUTION INTRAVENOUS PRN
Status: DISCONTINUED | OUTPATIENT
Start: 2021-12-16 | End: 2021-12-16 | Stop reason: HOSPADM

## 2021-12-16 RX ADMIN — DIAZEPAM 5 MG: 5 TABLET ORAL at 19:29

## 2021-12-16 RX ADMIN — DIAZEPAM 5 MG: 5 TABLET ORAL at 18:34

## 2021-12-16 NOTE — TELEPHONE ENCOUNTER
Spoke with  Diane James who was instructed by the ER manager that if they are able to do the antibody infusion in the ER. Dad notified and is on way to ER with Nikita Law now.

## 2021-12-16 NOTE — TELEPHONE ENCOUNTER
Yes, whatever we have to do  He was already in the ER though and they couldn't do it  Please give whatever note, rx he needs to get this done  There is a tight window and we are getting close

## 2021-12-16 NOTE — TELEPHONE ENCOUNTER
Patient dad is calling patient cannot get antibody testing in the infusion center because the patient cannot wear a mask. A  told his dad that if you write an order it can be done in the ED so he will not have to wear a mask.  His dad states that he is unable to wear a mask due to his disabilities

## 2021-12-16 NOTE — CARE COORDINATION
NEIL received call from [facility] SYS WASECA at PCP office asking for clarification. ACCHARISMA did let her know that a staff message was sent to PCP also, and he could feel free to call me  I informed her that the email from the [facility] [address] stated that Infusions can be done in the ER, and that if the pt returns to ER, then this needs approved by Dr Ave Brown when the pt arrives. Dr Ave Brown had previously sent referral for infusion to the infusion center in [facility], but they were unable to schedule the patient due to him not being able to tolerate a mask, and the need for sedation. Suggested perhaps PCP would like to call the ER Attending in advance to discuss before pt goes to ER. She states she will pass this along to the PCP for further direction before she calls the pt's guardian.

## 2021-12-16 NOTE — TELEPHONE ENCOUNTER
----- Message from Prateek Puente sent at 12/15/2021  4:37 PM EST -----  Subject: Message to Provider    QUESTIONS  Information for Provider? Patient tested positive for covid-19 last week. Patients dad is asking for pcp to call the ER to ok for patient to get a   antibody treatment because they denied him because he won't wear a mask.  ---------------------------------------------------------------------------  --------------  CALL BACK INFO  What is the best way for the office to contact you? OK to leave message on   voicemail  Preferred Call Back Phone Number? [phone]  ---------------------------------------------------------------------------  --------------  SCRIPT ANSWERS  Relationship to Patient? Parent  Representative Name? Paddyelsa Rachel  Patient is under 25 and the Parent has custody? No  Is the Representative on the appropriate HIPAA document in Epic?  Yes

## 2021-12-16 NOTE — ED NOTES
Department of Emergency Medicine    FIRST PROVIDER TRIAGE NOTE             Independent MLP           12/16/21  12:26 PM EST    Date of Encounter: 12/16/21   MRN: [id number]    HPI: Vinay Coyle is a 32 y.o. male who presents to the ED for Infusion (pt sent in for monoclonal antibody infusion, pt has disability and can be combative)  Patient tested positive for COVID 8 days ago     ROS: Negative for fever, vomiting or diarrhea. PE: CV: regular rate  Pulm: CTA bilat  95% room air      Initial Plan of Care: All treatment areas with department are currently occupied. Plan to order/Initiate the following while awaiting opening in ED: [facility] Infusion.     Initial Plan of Care: Initiate Treatment-Testing, Proceed toTreatment Area When Bed Available for ED Attending/MLP to Continue Care    Electronically signed by Callie Ramirez PA-C   DD: 12/16/21       Callie Ramirez PA-C  12/16/21 9585

## 2021-12-16 NOTE — CARE COORDINATION
Test message received from Pt's Guardian: \"Pop Cohen this is Ami Emanuel, it looks like your extraordinary efforts are paying off. They are going to administer via injection in er. We were headed to er anyway. He had a rough night and breathing is worse today. I will let you know what happens we just got here.  Thank you so much\"

## 2021-12-17 ENCOUNTER — CARE COORDINATION (OUTPATIENT)
Dept: CARE COORDINATION | Age: 26
End: 2021-12-17

## 2021-12-17 NOTE — ED NOTES
Attempt made to place IV access, patient continues to pull arm and push tourniquet away. Family requested additional dose of valium, provider made aware.       Jaxson Steel RN  12/16/21 2024

## 2021-12-17 NOTE — ED PROVIDER NOTES
ED Attending shared visit  CC: Merary          [facility]  Department of Emergency Medicine   ED  Encounter Note  Admit Date/RoomTime: 2021  1:16 PM  ED Room:   NAME: Scott Ansari  : 1995  MRN: [id number]     Chief Complaint:  Infusion (pt sent in for monoclonal antibody infusion, pt has disability and can be combative)    HISTORY OF PRESENT ILLNESS        Scott Ansari is a 32 y.o. male who presents to the ED by private vehicle for COVID infusion. Symptoms began 10 days ago. The complaint has been gradually improving and are moderate in severity. Family notes that he started with symptoms on the  and tested positive on the 8th. He was seen in the ER 3 days ago and was told he had COVID PNA. Patient is not vaccinated against COVID. Pt has a developmental disability and usually needs sedated for procedures per the family. They state he has been on steroids and ABX now for 3 days since his ER visit and starting to do better. They note that his cough is improving and deny any worsening SOB. They deny any fevers, calf pain/swelling, SOB, CP, or other sxs. Family denies smoking, asthma, COPD, or any other significant PMH. Pt unable to provide hx. ROS   Pertinent positives and negatives are stated within HPI, all other systems reviewed and are negative. Past Medical History:  has a past medical history of Autism, Cerumen impaction, and PONV (postoperative nausea and vomiting). Surgical History:  has a past surgical history that includes Myringotomy Tympanostomy Tube Placement and Ear Exm Under Gen Anesth (Bilateral, 2017). Social History:  reports that he has never smoked. He has never used smokeless tobacco. He reports that he does not drink alcohol. Family History: family history is not on file.      Allergies: Cefzil [cefprozil], Dust mite extract, Lac bovis, Lactose intolerance (gi), and Strattera  [atomoxetine hcl]    PHYSICAL EXAM   Oxygen Saturation in time range)   hydrocortisone sodium succinate PF (SOLU-CORTEF) injection 100 mg (has no administration in time range)   methylPREDNISolone sodium (SOLU-MEDROL) injection 125 mg (has no administration in time range)   famotidine (PEPCID) injection 20 mg (has no administration in time range)   EPINEPHrine PF 1 MG/ML injection 0.3 mg (has no administration in time range)   0.9 % sodium chloride infusion (has no administration in time range)   sodium chloride flush 0.9 % injection 5-40 mL (has no administration in time range)   sodium chloride flush 0.9 % injection 5-40 mL (has no administration in time range)   0.9 % sodium chloride infusion (has no administration in time range)   diazePAM (VALIUM) tablet 5 mg (5 mg Oral Given 12/16/21 1834)   diazePAM (VALIUM) tablet 5 mg (5 mg Oral Given 12/16/21 1929)     Consult(s):   None    Procedure(s):   none    MDM:   Pt presents for antibody infusion against COVID. This is day 10 of illness and sxs are improving. He was started on steroids and ABX 3 days ago by the ER. Pt has developmental disability and gets combative with IVs. They state he will need sedated. Family notes improvement of his sxs. No concerning sxs at this time since his last ER visit. Vitals WNL. Pt appears well. Normal PE. Pt given 5 mg Valium and nurse unable to place IV even though pt seemed calm from Valium. He was given more Valium and still unable to place IV for the infusion. Family states that he would need to be fully sedated. We discussed the risks/benefits of this and stated that the risk of sedation is far too great for the benefits of the infusion dorinda if the pt is doing better. We are also unable to sedate pt for the entire infusion. Family decided they do not want the infusion and will go home and continue with their current treatment as pt is improving. Follow-up with PCP. All questions answered. Patient agreeable with the plan.  Patient is in no acute distress, non-toxic, and appropriate for outpatient management. Pt educated on reasons to return to the ER, including need to return for new or worsening symptoms. Plan of Care/Counseling:  ESTHELA MOFFETT PA-C and EM Attending Physician reviewed today's visit with the patient in addition to providing specific details for the plan of care and counseling regarding the diagnosis and prognosis. Questions are answered at this time and are agreeable with the plan. ASSESSMENT     1. COVID      PLAN   Discharged home. Patient condition is good    New Medications     Discharge Medication List as of 12/16/2021  8:38 PM        Electronically signed by Anushka Montoya PA-C   DD: 12/16/21  **This report was transcribed using voice recognition software. Every effort was made to ensure accuracy; however, inadvertent computerized transcription errors may be present.   END OF ED PROVIDER NOTE        Ani Torres PA-C  12/16/21 [id number]

## 2021-12-17 NOTE — ED NOTES
Second attempt made to place IV access, patient continues to pull arm away and refuses to have tourniquet placed on arm, family is declining further attempts, PA made aware.       Radha Pelaez RN  12/16/21 2026

## 2021-12-17 NOTE — CARE COORDINATION
Spoke with pt's father, Min Adams. He seems to have 'much better breathing' today. Has no cough noted today  Pt was not able to tolerate getting the infusion in ER yesterday. Contact information provided. Will call if needed.

## 2021-12-21 ENCOUNTER — OFFICE VISIT (OUTPATIENT)
Dept: PRIMARY CARE CLINIC | Age: 26
End: 2021-12-21
Payer: COMMERCIAL

## 2021-12-21 ENCOUNTER — CARE COORDINATION (OUTPATIENT)
Dept: CARE COORDINATION | Age: 26
End: 2021-12-21

## 2021-12-21 VITALS
TEMPERATURE: 96.7 F | HEART RATE: 106 BPM | BODY MASS INDEX: 36.4 KG/M2 | HEIGHT: 71 IN | OXYGEN SATURATION: 98 % | WEIGHT: 260 LBS | RESPIRATION RATE: 18 BRPM

## 2021-12-21 DIAGNOSIS — F84.0 ACTIVE AUTISTIC DISORDER: ICD-10-CM

## 2021-12-21 DIAGNOSIS — U07.1 COVID-19: ICD-10-CM

## 2021-12-21 DIAGNOSIS — U07.1 COVID-19: Primary | ICD-10-CM

## 2021-12-21 PROCEDURE — 99214 OFFICE O/P EST MOD 30 MIN: CPT | Performed by: FAMILY MEDICINE

## 2021-12-21 RX ORDER — PREDNISONE 10 MG/1
TABLET ORAL
Qty: 12 TABLET | Refills: 0 | Status: SHIPPED | OUTPATIENT
Start: 2021-12-21 | End: 2021-12-27

## 2021-12-21 ASSESSMENT — PATIENT HEALTH QUESTIONNAIRE - PHQ9: DEPRESSION UNABLE TO ASSESS: FUNCTIONAL CAPACITY MOTIVATION LIMITS ACCURACY

## 2021-12-21 NOTE — PROGRESS NOTES
3/23/21  Little Porter : 1995 Sex: male  Age: 32 y.o. Chief Complaint   Patient presents with    Positive For Covid-19      symptoms started,  confirmed. HPI:   Jey Watt presents today with his mom/dad. Back to apparent baseline state of health. He has no history of Covid vaccine. He developed symptoms  and tested positive for Covid . He is status post Z-Ricky. Day 10 of doxycycline and Decadron 6 mg twice a day. Attempted monoclonal antibody infusion but did not tolerate the IV insertion. Unable to tolerate a mask. Unable to tolerate blood work. Seems back to baseline per parents, minimal if any cough, no apparent shortness of breath, acting/eating back to baseline. Hydrating. They are interested in chest x-ray and follow-up PCR. They are wondering if he should take a third antibiotic and extend the steroid. Review of Systems negative in general, nonverbal  ROS:  Const: Denies chills, fever, malaise and sweats. Eyes: Denies discharge, pain, redness and visual disturbance. ENMT: Denies earaches, other ear symptoms. Denies nasal or sinus symptoms other than stated  above. Denies mouth and tongue lesions and sore throat. CV: Denies chest discomfort, pain; diaphoresis, dizziness, edema, lightheadedness, orthopnea,  palpitations, syncope and near syncopal episode or any exertional symptoms  Resp: Denies cough, hemoptysis, pleuritic pain, SOB, sputum production and wheezing. GI: Denies abdominal pain, change in bowel habits, hematochezia, melena, nausea and vomiting. : Denies urinary symptoms including dysuria , urgency, frequency or hematuria. Musculo: Denies musculoskeletal symptoms. Skin: Denies bruising and rash.   Neuro: Denies headache, numbness, stiff neck, tingling and focal weakness slurred speech or facial  droop  Hema/Lymph: Denies bleeding/bruising tendency and enlarged lymph nodes        Current Outpatient Medications:     predniSONE (DELTASONE) 10 MG tablet, Take 3 tablets by mouth daily for 2 days, THEN 2 tablets daily for 2 days, THEN 1 tablet daily for 2 days. , Disp: 12 tablet, Rfl: 0    dexamethasone (DECADRON) 6 MG tablet, Take 1 tablet by mouth 2 times daily (with meals) for 10 days, Disp: 20 tablet, Rfl: 0    doxycycline monohydrate (MONODOX) 100 MG capsule, Take 1 capsule by mouth 2 times daily for 10 days, Disp: 20 capsule, Rfl: 0    fluticasone (FLONASE) 50 MCG/ACT nasal spray, 1 spray each nostril qd prn, Disp: 1 Bottle, Rfl: 6    acetaminophen (APAP EXTRA STRENGTH) 500 MG tablet, Take 2 tablets by mouth every 6 hours as needed for Pain, Disp: 120 tablet, Rfl: 3    Multiple Vitamins-Minerals (THERAPEUTIC MULTIVITAMIN-MINERALS) tablet, Take 1 tablet by mouth daily Last dose 8/14/2017, Disp: , Rfl:     cetirizine (ZYRTEC) 10 MG tablet, Take 10 mg by mouth daily, Disp: , Rfl:      Allergies   Allergen Reactions    Cefzil [Cefprozil]     Dust Mite Extract     Lac Bovis     Lactose Intolerance (Gi)     Strattera  [Atomoxetine Hcl]        Past Medical History:   Diagnosis Date    Autism     Cerumen impaction     PONV (postoperative nausea and vomiting)      Past Surgical History:   Procedure Laterality Date    EAR EXAM UNDER GENERAL ANESTHESIA Bilateral 08/24/2017    evaluation with cerumen removal    MYRINGOTOMY AND TYMPANOSTOMY TUBE PLACEMENT       No family history on file. Social History     Tobacco Use    Smoking status: Never Smoker    Smokeless tobacco: Never Used   Substance Use Topics    Alcohol use: No    Drug use: Not on file      Social History     Social History Narrative    PMH:    Medical Problems:    Autism - Has followed with Developmental specialists, Dr Olegario Gilbert. Has tried a number of meds    including ritalin. Was part of experimental study for autism (vegetable enzyme) - pt allergic. Has    been to [facility]. Previously  ST/OT. Does not qualify for PT.  Nonverbal. Uses PEC (picture exchange    system)    PDD - pervasive developmental diagnosis    Surgical Hx:    tymp tubes - 4sets. Follows with Dr Jason Potter. FH:    Father:    . (Hx)    Mother:    . (Hx)    Denies    SH:    . (Marital)Mom, Dad, Sister (3yrs older)    Personal Habits: Cigarette Use: Never Smoked Cigarettes. Vitals:    12/21/21 1302   Pulse: 106   Resp: 18   Temp: 96.7 °F (35.9 °C)   TempSrc: Temporal   SpO2: 98%   Weight: 260 lb (117.9 kg)   Height: 5' 11\" (1.803 m)       Physical Exam  Exam:  Const: Appears comfortable. No signs of acute distress present. Head/Face: Atraumatic on inspection. Eyes: EOMI in both eyes. No discharge from the eyes. PERRL. Sclerae clear. ENMT: Ears grossly clear nose clear oropharynx moist mucous membranes  Neck: Supple. Palpation reveals no adenopathy. No masses appreciated. No JVD. Carotids: no  bruits. Resp: Respirations are unlabored. Clear to auscultation. No rales, rhonchi or wheezes appreciated  over the lungs bilaterally. CV: Rate is regular. Rhythm is regular. No gallop or rubs. No heart murmur appreciated. Extremities: No clubbing, cyanosis, or edema. No calf inflammation or tenderness. Abdomen: Bowel sounds are normoactive. Abdomen is soft, nontender, and nondistended. No  abdominal masses. No palpable hepatosplenomegaly. Lymph: No palpable or visible regional lymphadenopathy. Musculoskeletal: no acute joint inflammation. Skin: Dry and warm with no rash. Skin normal to inspection and palpation overall. Neuro: Grossly intact. Difficult to do involved exam    Office Labs This Visit :  No results found for this visit on 12/21/21. Assessment and Plan:   Diagnosis Orders   1. COVID-19  XR CHEST (2 VW)    COVID-19 Ambulatory    predniSONE (DELTASONE) 10 MG tablet   2. Active autistic disorder        Covid-19  Currently clinically back to baseline, asx at this time      Counseled extensively. Potential comorbidities that may increase risk reviewed. Standard precautions reviewed. Quarantine recommendations reviewed. There was shared decision making throughout the process. Counseled on the  risk and benefits of, and literature regarding the use of various over-the-counter products including (at appropriate doses and dosing intervals if no contraindication) Tylenol, zinc, vitamin C, probiotics etc.  Also counseled on appropriate hydration, potential role of prone position, use of nasal saline, coolmist vaporizer, honey, plain Mucinex and nasal steroids. Reviewed statements recommending against NSAIDs first-line and risks and benefits of this class of meds anyhow including GI/renal/cardiac/pulmonary as well as the contraindications for these types of meds. Discussed role of labs and imaging including chest imaging, defers labs, would like chest x-ray. Defers advanced imaging. VSS the risk of secondary bacterial infection reviewed. The risk and benefits of antibiotic therapy reviewed with various options reviewed. Status post Z-Ricky and doxycycline, currently on probiotic and counseled on R/P and current recommendations  We counseled on the role of systemic steroids, the potential risks and benefits of and various dosing options. As to avoid abrupt cessation we will phone in tapering prednisone starting the day after he completes his Decadron. Risk benefits reviewed. The role of and the potential risks and benefits of albuterol reviewed. Asymptomatic  Antibody infusion-attempted but unable to tolerate IV        The unfortunate uncertainties and unpredictable nature of this virus reviewed. Various potential outcomes reviewed including complete recovery, debilitating sequelae which can be permanent as well as fatality. Certain aspects of this virus including the potential thrombotic risk can cause sudden debilitating/fatal events, without warning.   Therefore signs and symptoms to watch for reviewed at length, with very low threshold for reevaluation if needed and presentation to the emergency room immediately if any serious signs or symptoms which were reviewed at length, including the persistence or worsening of any symptoms mentioned above. The risk of postponing this reviewed. Family verbalizes complete understanding, verbalizes understanding of various options and agrees to proceed as above. Emphasized importance of close monitoring of   vital signs and other signs and symptoms. Health maintenance examination  Health maintenance issues discussed at length as above, 3/21. Encourage yearly    Active autistic disorder  History of. Nonverbal.  Needs guardianship. Mom doing an excellent job    Allergic rhinitis  Counseled, uses as needed Flonase/as needed Zyrtec. Plan as above. Counseled extensively and differential diagnoses relevant to above were reviewed, including serious etiologies. Side effects and interactions of medications were reviewed. Counseled. Intolerant to administration of vaccines, blood work and so parents would like to stay conservative. Seems in baseline health. Parents providing excellent care. They would like a follow-up PCR and chest x-ray. Extend steroid as above. Probiotic as directed. They will notify us of any symptoms. Otherwise they are planning to keep her yearly follow-up sooner as needed      As long as symptoms steadily improve/resolve, and medical conditions follow the expected course, FU as below, sooner PRN. No follow-ups on file. Over 30minutes  spent with the patient in reviewing records, reviewing with patient/family, counseling, ordering,  prescribing, completing h&p, etc., with over 50% of the time spent  counseling. Signs and symptoms to watch for discussed, serious signs and symptoms reviewed. ER if any. Claudell Aguas, MD    Patients are advised to check with insurance company to ensure coverage and to fully understand benefits and cost prior to any testing.   This note was created with the assistance of voice recognition software. Document was reviewed however may contain grammatical errors.

## 2021-12-21 NOTE — CARE COORDINATION
Text received from Pt's father, Megan De Oliveira: \"Pop Rogers, I hope you are well. Quick update on Judith. He has been continuously improving since last Thursday when we were in the er. He slept for most of the past 4 days. His temp is now normal w/o any Tylenol, ox level 97 to 99 for the past few days, cough and congestion gone, appetite returning. We see primary care for follow up tomorrow. Thanks again for all your help and support. Rafael Ochoa and God's blessings to you.   Hermelindo Gabriel\"

## 2021-12-22 ENCOUNTER — TELEPHONE (OUTPATIENT)
Dept: PRIMARY CARE CLINIC | Age: 26
End: 2021-12-22

## 2021-12-22 ENCOUNTER — VIRTUAL VISIT (OUTPATIENT)
Dept: PRIMARY CARE CLINIC | Age: 26
End: 2021-12-22
Payer: COMMERCIAL

## 2021-12-22 DIAGNOSIS — E86.0 DEHYDRATION: ICD-10-CM

## 2021-12-22 DIAGNOSIS — F84.0 ACTIVE AUTISTIC DISORDER: ICD-10-CM

## 2021-12-22 DIAGNOSIS — U07.1 COVID-19: ICD-10-CM

## 2021-12-22 DIAGNOSIS — R55 SYNCOPE, UNSPECIFIED SYNCOPE TYPE: Primary | ICD-10-CM

## 2021-12-22 PROCEDURE — 99215 OFFICE O/P EST HI 40 MIN: CPT | Performed by: FAMILY MEDICINE

## 2021-12-22 SDOH — ECONOMIC STABILITY: FOOD INSECURITY: WITHIN THE PAST 12 MONTHS, YOU WORRIED THAT YOUR FOOD WOULD RUN OUT BEFORE YOU GOT MONEY TO BUY MORE.: NEVER TRUE

## 2021-12-22 SDOH — ECONOMIC STABILITY: FOOD INSECURITY: WITHIN THE PAST 12 MONTHS, THE FOOD YOU BOUGHT JUST DIDN'T LAST AND YOU DIDN'T HAVE MONEY TO GET MORE.: NEVER TRUE

## 2021-12-22 ASSESSMENT — PATIENT HEALTH QUESTIONNAIRE - PHQ9
SUM OF ALL RESPONSES TO PHQ QUESTIONS 1-9: 0
1. LITTLE INTEREST OR PLEASURE IN DOING THINGS: 0
2. FEELING DOWN, DEPRESSED OR HOPELESS: 0
SUM OF ALL RESPONSES TO PHQ QUESTIONS 1-9: 0
SUM OF ALL RESPONSES TO PHQ9 QUESTIONS 1 & 2: 0
SUM OF ALL RESPONSES TO PHQ QUESTIONS 1-9: 0

## 2021-12-22 ASSESSMENT — SOCIAL DETERMINANTS OF HEALTH (SDOH): HOW HARD IS IT FOR YOU TO PAY FOR THE VERY BASICS LIKE FOOD, HOUSING, MEDICAL CARE, AND HEATING?: NOT HARD AT ALL

## 2021-12-22 NOTE — TELEPHONE ENCOUNTER
Are these new onset? Any history seizures? If not, absolutely (unfortunately) should have him evaluated ASAP at ER.  Let me know

## 2021-12-22 NOTE — PROGRESS NOTES
TeleMedicine Patient Consent    This visit was performed as a virtual video visit using a synchronous, two-way, audio-video telehealth technology platform. Patient identification was verified at the start of the visit, including the patient's telephone number and physical location. I discussed with the patient the nature of our telehealth visits, that:     1. Due to the nature of an audio- video modality, the only components of a physical exam that could be done are the elements supported by direct observation. 2. I would evaluate the patient and recommend diagnostics and treatments based on my assessment. 3. If it was felt that the patient should be evaluated in clinic or an emergency room setting, then they would be directed there. 4. Our sessions are not being recorded and that personal health information is protected. 5. Our team would provide follow up care in person if/when the patient needs it. Patient does agree to proceed with telemedicine consultation. Patient's location: home address in [facility]    Physician  location other address in [place]     Other people involved in call:   mom and dad    This visit was completed virtually using Doxy. me    2021    TELEHEALTH EVALUATION -- Audio/Visual (During WWIQU-32 public health emergency)    Chief Complaint   Patient presents with    Seizures     last night            HPI:    Blu Pete (:  1995) has requested an audio/video evaluation for the following concern(s):    Went to bed 3pm, woke up 7pm. Was going up stairs around 8    epidisode lasted 1 - 1/2 min    Sat up Yusef style, eyes open, breathing. Sat 5-6min as EMS was arriving. Then able to get up.  Seemed abit disoriented and wobbly    paremedics did vitals in his bed      Slept until 3pm  Acting completely nL, eating more than they've seen in a long time  Following commands, all back to baseline apparently    NL likes to go to bed 10:30 - 7am  Since covid, sleeping much more, a lot of the day. Yesterday the most he was up bc of appt here etc    Today up since about 3-4:30, back to sleep now  Opens eyes easily, pushes them away in that he wants to sleep    Fist wk and a half, barely eating, pushing fluids with difficulty, etc  Finally felt better after abx/steroid  In ER all day Thursday, exhausted    Parents see great improvement in him today, even compared to prior to episode yesterday. Improvement each day. cxr neg nL    Wasn't ryhthmically shaking, more like cold    Pt was alert the entire time trying to get back up  No loss of urine or bowels      Above is a summary that I was typing as I was speaking with parents  Patient developed symptoms of Covid 12/6 that was confirmed on 12/8  . He had various symptoms, including fatigue. Was seen in [facility] care, given Z-Ricky, made a couple attempts at the ER and to have monoclonal antibody infusion but was unable to tolerate, ultimately sent home from the ER with Decadron and doxycycline 12/13/2021. Seem to be doing well after that except for fatigue. Had office visit yesterday, appeared clinically well, chest x-ray was clear. That was the longest he was up for a while. He went home went to bed at 3 PM woke up 7 PM wanted to go back to bed but dad states he thought to be better to keep up a bit. Started go upstairs around 8:00 to brush his teeth, dad went to help him and he suddenly became weak, Trembley and softly fell/dad assisted him to the ground to his knees and ultimately to his side where he was shaky for approximately 1 to 1/2 minutes. He was alert the entire time. He was trying to get up. He had no rhythmic movements. No loss of urine or bowels. Seem to come back to baseline mental status very quickly, eventually sat on the floor and then got up and went to bed.   Paramedics end up assessing in bed, felt he was stable, offered to bring him to the emergency room but with the negative experience parents of had in the ER opted against it. He continues to sleep a good part of the day. However when he got up this afternoon he drank and ate more than he has in a long long time, and every day seems to be getting better. When he sleeping at a light sleep, wakes up easily. Other than fatigue seems baseline now. No other complaints but nonverbal making it challenging    Of note there is fairly strong family history of vasovagal event including dad and paternal grandmother. With Covid he had not been eating and drinking well though finally starting to drink and eat better today    With the concern of new onset seizure of course it was advised to go to the emergency room where a full work-up would need to take place including possible hydration, blood work, MRI brain which would require sedation, neurology consultation, EEG as well as cardiac work-up. Multiple tests including blood work cardiac work-up and neurology tests could be done even if suspected vasovagal as well as proper hydration etc.  Fortunately very difficult and that is difficult to get blood on him, gets combative with blood work, will need sedated for MRI etc.  Again offered at least neurology consultation, EEG, see below    Now back to baseline, nonverbal    But appears well and parents instincts are that he is back to baseline at this time. Did not have an obvious postictal phase      Current Outpatient Medications:     predniSONE (DELTASONE) 10 MG tablet, Take 3 tablets by mouth daily for 2 days, THEN 2 tablets daily for 2 days, THEN 1 tablet daily for 2 days. , Disp: 12 tablet, Rfl: 0    dexamethasone (DECADRON) 6 MG tablet, Take 1 tablet by mouth 2 times daily (with meals) for 10 days, Disp: 20 tablet, Rfl: 0    doxycycline monohydrate (MONODOX) 100 MG capsule, Take 1 capsule by mouth 2 times daily for 10 days, Disp: 20 capsule, Rfl: 0    fluticasone (FLONASE) 50 MCG/ACT nasal spray, 1 spray each nostril qd prn, Disp: 1 Bottle, Rfl: 6    acetaminophen (APAP EXTRA STRENGTH) 500 MG tablet, Take 2 tablets by mouth every 6 hours as needed for Pain, Disp: 120 tablet, Rfl: 3    Multiple Vitamins-Minerals (THERAPEUTIC MULTIVITAMIN-MINERALS) tablet, Take 1 tablet by mouth daily Last dose 8/14/2017, Disp: , Rfl:     cetirizine (ZYRTEC) 10 MG tablet, Take 10 mg by mouth daily, Disp: , Rfl:   Allergies   Allergen Reactions    Cefzil [Cefprozil]     Dust Mite Extract     Lac Bovis     Lactose Intolerance (Gi)     Strattera  [Atomoxetine Hcl]        Past Medical History:   Diagnosis Date    Autism     Cerumen impaction     PONV (postoperative nausea and vomiting)      Past Surgical History:   Procedure Laterality Date    EAR EXAM UNDER GENERAL ANESTHESIA Bilateral 08/24/2017    evaluation with cerumen removal    MYRINGOTOMY AND TYMPANOSTOMY TUBE PLACEMENT       No family history on file. Social History     Tobacco Use    Smoking status: Never Smoker    Smokeless tobacco: Never Used   Substance Use Topics    Alcohol use: No    Drug use: Not on file     Social History     Social History Narrative    PMH:    Medical Problems:    Autism - Has followed with Developmental specialists, Dr Chu Ornelas. Has tried a number of meds    including ritalin. Was part of experimental study for autism (vegetable enzyme) - pt allergic. Has    been to [facility]. Previously  ST/OT. Does not qualify for PT. Nonverbal. Uses PEC (picture exchange    system)    PDD - pervasive developmental diagnosis    Surgical Hx:    tymp tubes - 4sets. Follows with Dr Karl Castle. FH:    Father:    . (Hx)    Mother:    . (Hx)    Denies    SH:    . (Marital)Mom, Dad, Sister (3yrs older)    Personal Habits: Cigarette Use: Never Smoked Cigarettes.                  PHYSICAL EXAMINATION:  [ INSTRUCTIONS:  \"[x]\" Indicates a positive item  \"[]\" Indicates a negative item  -- DELETE ALL ITEMS NOT EXAMINED]  Vital Signs: (As obtained by patient/caregiver or practitioner observation)    There were no vitals filed for this visit. Blood pressure-  Heart rate-    Respiratory rate-    Temperature-  Pulse oximetry-     Constitutional: [x] Appears well-developed and well-nourished [x] No apparent distress      [] Abnormal-   Mental status  [x] Alert and awake  [x] Oriented to person/place/time [x]Able to follow commands      Eyes:  EOM    [x]  Normal  [] Abnormal-  Sclera  [x]  Normal  [] Abnormal -         Discharge [x]  None visible  [] Abnormal -    HENT:   [x] Normocephalic, atraumatic. [] Abnormal   [x] Mouth/Throat: Mucous membranes are moist.     External Ears [x] Normal  [] Abnormal-     Neck: [x] No visualized mass     Pulmonary/Chest: [x] Respiratory effort normal.  [x] No visualized signs of difficulty breathing or respiratory distress        [] Abnormal-      Musculoskeletal:   [x] Normal gait with no signs of ataxia         [x] Normal range of motion of neck        [] Abnormal-       Neurological:        [x] No Facial Asymmetry (Cranial nerve 7 motor function) (limited exam to video visit)          [x] No gaze palsy        [] Abnormal-         Skin:        [x] No significant exanthematous lesions or discoloration noted on facial skin         [] Abnormal-            Psychiatric:       [x] Normal Affect [x] No Hallucinations        [] Abnormal-     Other pertinent observable physical exam findings-     ASSESSMENT/PLAN:   Diagnosis Orders   1. Syncope, unspecified syncope type     2. COVID-19     3. Active autistic disorder     4. Dehydration         No problem-specific Assessment & Plan notes found for this encounter. 1. Syncope, unspecified syncope type  Counseled extensively. Differential reviewed, including serious etiologies. Initially quite concerning for new onset seizure. Etiologies of this including brain tumor, electrolyte imbalance, hypoperfusion reviewed and life-threatening consequences of seizure reviewed. After extensive discussion, it is certainly plausible that this was more of a vasovagal event. He has had recent Covid with poor p.o. intake. However given this we cannot rule out underlying anemia electrolyte imbalance need for IV fluid and of course various testing to ensure no other possibilities were again reviewed including blood work MRI brain neurology consult EEG cardiac work-up including echo event monitor, IV fluids etc.  Falls precautions reviewed. See further discussion below    2. COVID-19  Recent, other than fatigue no other symptoms now. He finished doxycycline, tapering oral steroid. Starting to eat and drink better. 3. Active autistic disorder  Nonverbal complicating matters    Suspected dehydration  Counseled on the role of IV fluid, but finally starting to take p.o. better today. Counseled extensively and differential diagnoses of above were reviewed, including serious etiologies. Side effects and interactions of medications were reviewed. Plan as above:  Counseled extensively. Parents are excellent caretakers in a difficult situation and that it is difficult to proceed with recommended work-up and they have had a very negative experience in the ER/hospital.  They are using her instincts combined with the possibility of a vasovagal event and the fact that he seems to be doing better and they prefer observation now understanding the risk if there was an underlying etiology and the risks of seizures including debilitating/life-threatening risk. However they are well-informed, making an educated informed decision and again prefer to stay conservative at this time for various reasons excepting the risk. They will continue to encourage proper nutrition and fluid intake. Increase sodium intake and compression socks may further help combat vasovagal syncope. They are not interested in emergency room/hospital at this time, CT/MRI, IV fluid, blood work, neurology consult, EEG, cardiology consult, cardiology test or otherwise.   At this point he simply to notify me if any concerns otherwise proceed as previously discussed. Again counseled extensively and they verbalized full understanding    As long as symptoms steadily improve/resolve and medical conditions are following the expected course, FU as below, sooner PRN      No follow-ups on file. Time spent: Greater than 60    Despite extensive discussion, declined in ER/hospitalization or other evaluation or treatment other than above. If they  change their mind, symptoms persist or worsen, or other serious signs or sympoms, they are to  go to ER immediately as instructed. They understand my concerns and accept the risk of not  complying including sudden debillitating/fatal event and risk of untreatable condition if not  properly dx/tx early. If they continue to decline ER, but change mind on further evaluation or  treatment, notify immediately. Otherwise at least follow up as above, sooner prn. Over 60minutes  spent with the patient in reviewing records,   counseling,  ompleting h&p, etc., with over 50% of the time spent counseling. Blu Pete is a 32 y.o. male being evaluated by a Virtual Visit (video visit) encounter to address concerns as mentioned above. A caregiver was present when appropriate. Due to this being a TeleHealth encounter (During DZWQF-04 public health emergency), evaluation of the following organ systems was limited: Vitals/Constitutional/EENT/Resp/CV/GI//MS/Neuro/Skin/Heme-Lymph-Imm. Pursuant to the emergency declaration under the [facility], [address] [place] [address] authority and the [employer] Act, this Virtual Visit was conducted with patient's (and/or legal guardian's) consent, to reduce the patient's risk of exposure to COVID-19 and provide necessary medical care.   The patient (and/or legal guardian) has also been advised to contact this office for worsening conditions or problems, and seek emergency medical treatment and/or call 911 if deemed necessary. Services were provided through a video synchronous discussion virtually to substitute for in-person clinic visit. Various options to be seen in person were discussed. Patients are advised to check with insurance company to ensure coverage and to fully understand benefits and cost prior to any testing to try to avoid unexpected charges. This note was created with the assistance of voice recognition software. Inadvertent errors may be present. Signs and symptoms to watch for were discussed. Serious signs and symptoms reviewed. ER if any    --Marcial Abel MD on 12/22/2021 at 6:04 PM    An electronic signature was used to authenticate this note.

## 2021-12-22 NOTE — TELEPHONE ENCOUNTER
Mom calling states that after patient was seen yesterday as he was getting ready for bed patient had a seizure. Paramedics checked him and stated everything was okay and advised that they could take him to ER but parents refused at that time. Mom states that patient is still sleeping since incident last night and they have been checking on him and he is doing okay just very tired. Mom wanted you to be aware what was going on.

## 2021-12-23 LAB
SARS-COV-2: DETECTED
SOURCE: ABNORMAL

## 2022-04-05 DIAGNOSIS — J31.0 CHRONIC RHINITIS: ICD-10-CM

## 2022-04-05 RX ORDER — FLUTICASONE PROPIONATE 50 MCG
SPRAY, SUSPENSION (ML) NASAL
Qty: 16 G | Refills: 5 | Status: SHIPPED | OUTPATIENT
Start: 2022-04-05

## 2022-04-12 ENCOUNTER — OFFICE VISIT (OUTPATIENT)
Dept: PRIMARY CARE CLINIC | Age: 27
End: 2022-04-12
Payer: COMMERCIAL

## 2022-04-12 VITALS
HEIGHT: 71 IN | BODY MASS INDEX: 39.11 KG/M2 | OXYGEN SATURATION: 97 % | TEMPERATURE: 97.1 F | WEIGHT: 279.4 LBS | HEART RATE: 92 BPM

## 2022-04-12 DIAGNOSIS — H61.23 BILATERAL IMPACTED CERUMEN: ICD-10-CM

## 2022-04-12 DIAGNOSIS — Z00.00 HEALTH MAINTENANCE EXAMINATION: Primary | ICD-10-CM

## 2022-04-12 DIAGNOSIS — F84.0 ACTIVE AUTISTIC DISORDER: ICD-10-CM

## 2022-04-12 DIAGNOSIS — J31.0 CHRONIC RHINITIS: ICD-10-CM

## 2022-04-12 PROCEDURE — 99395 PREV VISIT EST AGE 18-39: CPT | Performed by: FAMILY MEDICINE

## 2022-04-12 NOTE — PROGRESS NOTES
Pins : 1995 Sex: male  Age: 32 y.o. Chief Complaint   Patient presents with    Annual Exam     physical       HPI:   Roxana Ward presents today with his mom/guardian. In baseline state of health. Has forms to be completed. Chronic cerumen. Has seen ENT. Defers office procedure. Going to try Debrox and gentle irrigation at home. Unable to do visual exam.  Encouraged TC by Dr. Claudia Chiang defers immunizations, has not had any since 25months of age. Defers blood work. ROS: Negative in general, nonverbal  Const: Denies chills, fever, malaise and sweats. Eyes: Denies discharge, pain, redness and visual disturbance. ENMT: Denies earaches, other ear symptoms. Denies nasal or sinus symptoms other than stated  above. Denies mouth and tongue lesions and sore throat. CV: Denies chest discomfort, pain; diaphoresis, dizziness, edema, lightheadedness, orthopnea,  palpitations, syncope and near syncopal episode or any exertional symptoms  Resp: Denies cough, hemoptysis, pleuritic pain, SOB, sputum production and wheezing. GI: Denies abdominal pain, change in bowel habits, hematochezia, melena, nausea and vomiting. : Denies urinary symptoms including dysuria , urgency, frequency or hematuria. Musculo: Denies musculoskeletal symptoms. Skin: Denies bruising and rash. Neuro: Denies headache, numbness, stiff neck, tingling and focal weakness slurred speech or facial  droop  Hema/Lymph: Denies bleeding/bruising tendency and enlarged lymph nodes      Health Maintenance:  Proper diet reviewed including Mediterranean and DASH diets. Counseled on healthy weight, appropriate exercise. Counseled on the potential pros and cons of vitamins and supplements. Reviewed the recommendations and risk/benefits of vaccines including Td/Tdap,  pneumovax,  prevnar 13 , flu vaccine, Hepatitis vaccines, gardasil, and shingrix (patient had chicken pox). Intolerant to the administration vaccines.   Mom states has not had vaccines since 25months of age and not interested in any at this time. HIV and Hep C screening guidelines were reviewed. Intolerant to blood work and so defers. Importance of regular eye and dental exams and health reviewed. Sun protection reviewed. Notify if any new or changing moles/skin lesions, etc.       Indications for EKG and indications for additional  cardiac testing including referrals, stress testing,  2d echo, ect. dasx  Reviewed indications for other testing such as  PFT's.and  indications for imaging including brain, carotid, chest, abdominal, aortic .  dasx        Current Outpatient Medications:     Pyridoxine HCl (VITAMIN B6 PO), Take by mouth, Disp: , Rfl:     fluticasone (FLONASE) 50 MCG/ACT nasal spray, 1 spray each nostril qd prn, Disp: 16 g, Rfl: 5    Multiple Vitamins-Minerals (THERAPEUTIC MULTIVITAMIN-MINERALS) tablet, Take 1 tablet by mouth daily Last dose 8/14/2017, Disp: , Rfl:     cetirizine (ZYRTEC) 10 MG tablet, Take 10 mg by mouth daily, Disp: , Rfl:      Allergies   Allergen Reactions    Cefzil [Cefprozil]     Dust Mite Extract     Lac Bovis     Lactose Intolerance (Gi)     Strattera  [Atomoxetine Hcl]        Past Medical History:   Diagnosis Date    Autism     Cerumen impaction     PONV (postoperative nausea and vomiting)      Past Surgical History:   Procedure Laterality Date    EAR EXAM UNDER GENERAL ANESTHESIA Bilateral 08/24/2017    evaluation with cerumen removal    MYRINGOTOMY AND TYMPANOSTOMY TUBE PLACEMENT       No family history on file. Social History     Tobacco Use    Smoking status: Never Smoker    Smokeless tobacco: Never Used   Substance Use Topics    Alcohol use: No    Drug use: Not on file      Social History     Social History Narrative    PMH:    Medical Problems:    Autism - Has followed with Developmental specialists, Dr Ginger Hayden. Has tried a number of meds    including ritalin.  Was part of experimental study for autism (vegetable enzyme) - pt allergic. Has    been to [facility]. Previously  ST/OT. Does not qualify for PT. Nonverbal. Uses PEC (picture exchange    system)    PDD - pervasive developmental diagnosis    Surgical Hx:    tymp tubes - 4sets. Follows with Dr Ally Ball. FH:    Father:    . (Hx)    Mother:    . (Hx)    Denies    SH:    . (Marital)Mom, Dad, Sister (3yrs older)    Personal Habits: Cigarette Use: Never Smoked Cigarettes. Vitals:    04/12/22 1403   BP: Comment: unable to obtain   Pulse: 92   Temp: 97.1 °F (36.2 °C)   SpO2: 97%   Weight: 279 lb 6.4 oz (126.7 kg)   Height: 5' 11\" (1.803 m)         Exam:  Const: Appears comfortable. No signs of acute distress present. Head/Face: Atraumatic on inspection. Eyes: EOMI in both eyes. No discharge from the eyes. PERRL. Sclerae clear. ENMT: Auditory canals reveal mild bilateral cerumen, TMs not well visualized external nose WNL. Nasal mucosa is clear. Oropharynx: No erythema or exudate. Posterior pharynx is normal.  Neck: Supple. Palpation reveals no adenopathy. No masses appreciated. No JVD. Carotids: no  bruits. Resp: Respirations are unlabored. Clear to auscultation. No rales, rhonchi or wheezes appreciated  over the lungs bilaterally. CV: Rate is regular. Rhythm is regular. No gallop or rubs. No heart murmur appreciated. Extremities: No clubbing, cyanosis, or edema. No calf inflammation or tenderness. Abdomen: Bowel sounds are normoactive. Abdomen is soft, nontender, and nondistended. No  abdominal masses. No palpable hepatosplenomegaly. Obese  Genitaliadeferred  Lymph: No palpable or visible regional lymphadenopathy. Musculoskeletal: no acute joint inflammation. Skin: Dry and warm with no rash. Skin normal to inspection and palpation overall. Neuro: Grossly intact. Difficult to do involved exam    Office Labs This Visit :  No results found for this visit on 04/12/22. Assessment and Plan:   Diagnosis Orders   1.  Health maintenance examination 2. Active autistic disorder     3. Bilateral impacted cerumen     4. Chronic rhinitis            Health maintenance examination  Health maintenance issues discussed at length as above, 4/12/2022. Encourage yearly    Active autistic disorder  History of. Nonverbal.  Needs guardianship. Mom doing an excellent job    Bilateral impacted cerumen  History of. Has seen Dr. Basim Odonnell. We did discuss appropriate use of Debrox and bulb irrigation. R/B reviewed     Allergic rhinitis  Counseled, does well with Flonase 1 spray each nostril daily as needed and Zyrtec 10 mg daily as needed. Plan as above. Counseled extensively and differential diagnoses relevant to above were reviewed, including serious etiologies. Side effects and interactions of medications were reviewed. Counseled. Intolerant to administration of vaccines, blood work and so mom would like to stay conservative. Seems in baseline health. Forms completed. They will plan yearly follow-up for physical sooner as needed        As long as symptoms steadily improve/resolve, and medical conditions follow the expected course, FU as below, sooner PRN. Return in about 1 year (around 4/12/2023), or if symptoms worsen or fail to improve, for physical.        Signs and symptoms to watch for discussed, serious signs and symptoms reviewed. ER if any. Bonnie Wallace MD    Patients are advised to check with insurance company to ensure coverage and to fully understand benefits and cost prior to any testing. This note was created with the assistance of voice recognition software. Document was reviewed however may contain grammatical errors.

## 2022-07-18 ENCOUNTER — OFFICE VISIT (OUTPATIENT)
Dept: FAMILY MEDICINE CLINIC | Age: 27
End: 2022-07-18
Payer: COMMERCIAL

## 2022-07-18 VITALS — BODY MASS INDEX: 38.91 KG/M2 | WEIGHT: 279 LBS | TEMPERATURE: 97.5 F

## 2022-07-18 DIAGNOSIS — Z20.822 EXPOSURE TO COVID-19 VIRUS: ICD-10-CM

## 2022-07-18 DIAGNOSIS — Z20.822 SUSPECTED COVID-19 VIRUS INFECTION: ICD-10-CM

## 2022-07-18 DIAGNOSIS — Z20.822 EXPOSURE TO COVID-19 VIRUS: Primary | ICD-10-CM

## 2022-07-18 DIAGNOSIS — R05.9 COUGH: ICD-10-CM

## 2022-07-18 LAB
Lab: NORMAL
PERFORMING INSTRUMENT: NORMAL
QC PASS/FAIL: NORMAL
SARS-COV-2, POC: NORMAL

## 2022-07-18 PROCEDURE — 87426 SARSCOV CORONAVIRUS AG IA: CPT | Performed by: PHYSICIAN ASSISTANT

## 2022-07-18 PROCEDURE — 99213 OFFICE O/P EST LOW 20 MIN: CPT | Performed by: PHYSICIAN ASSISTANT

## 2022-07-18 NOTE — PROGRESS NOTES
22  Scott Ansari : 1995 Sex: male  Age 32 y.o. Subjective:  Chief Complaint   Patient presents with    Positive For Covid-19     Tested positive at home last night         HPI:   Scott Ansari , 32 y.o. male presents to [facility] for evaluation of covid-19    HPI  CC:    Chief Complaint   Patient presents with    Positive For Covid-19     Tested positive at home last night       Onset: 7 days    Fever:  No    TMAX:   97.5 °F    Vaccine: No   Booster:  No   Flu shot:  No   Exposure:  Yes     Treatments:  tylenol    Aggravating or Alleviating factors:  cough last Tuesday   Chest pain:  No SOB:  No Myalgias: No Loss of smell:  No   Loss of Taste:  No Dizziness:  No  Fatigue:  No Headache:  No      COVID previously: Yes   Miscellaneous:  2021    Home test was positive. Here to confirm. There is only a faint line. The patient's symptoms started last Tuesday. ROS:   Unless otherwise stated in this report the patient's positive and negative responses for review of systems for constitutional, eyes, ENT, cardiovascular, respiratory, gastrointestinal, neurological, , musculoskeletal, and integument systems and related systems to the presenting problem are either stated in the history of present illness or were not pertinent or were negative for the symptoms and/or complaints related to the presenting medical problem. Positives and pertinent negatives as per HPI. All others reviewed and are negative. PMH:     Past Medical History:   Diagnosis Date    Autism     Cerumen impaction     PONV (postoperative nausea and vomiting)        Past Surgical History:   Procedure Laterality Date    EAR EXAM UNDER GENERAL ANESTHESIA Bilateral 2017    evaluation with cerumen removal    MYRINGOTOMY AND TYMPANOSTOMY TUBE PLACEMENT         History reviewed. No pertinent family history.     Medications:     Current Outpatient Medications:     Pyridoxine HCl (VITAMIN B6 PO), Take by mouth, Disp: , Rfl:     fluticasone (FLONASE) 50 MCG/ACT nasal spray, 1 spray each nostril qd prn, Disp: 16 g, Rfl: 5    Multiple Vitamins-Minerals (THERAPEUTIC MULTIVITAMIN-MINERALS) tablet, Take 1 tablet by mouth daily Last dose 8/14/2017, Disp: , Rfl:     cetirizine (ZYRTEC) 10 MG tablet, Take 10 mg by mouth daily, Disp: , Rfl:     Allergies: Allergies   Allergen Reactions    Cefzil [Cefprozil]     Dust Mite Extract     Lac Bovis     Lactose Intolerance (Gi)     Strattera  [Atomoxetine Hcl]        Social History:     Social History     Tobacco Use    Smoking status: Never    Smokeless tobacco: Never   Substance Use Topics    Alcohol use: No       Patient lives at home. Physical Exam:     Vitals:    07/18/22 1148   Temp: 97.5 °F (36.4 °C)   TempSrc: Temporal   Weight: 279 lb (126.6 kg)       Exam:  Physical Exam  Nurse's notes and vital signs reviewed. The patient is not hypoxic. ? General: Alert, no acute distress, patient resting comfortably Patient is not toxic or lethargic. Skin: Warm, intact, no pallor noted. There is no evidence of rash at this time. Head: Normocephalic, atraumatic  Eye: Normal conjunctiva  Ears, Nose, Throat: Right tympanic membrane clear, left tympanic membrane clear. No drainage or discharge noted. No pre- or post-auricular tenderness, erythema, or swelling noted. Minimal congestion  Posterior oropharynx shows no erythema, tonsillar hypertrophy, or exudate. the uvula is midline. No trismus or drooling is noted. Moist mucous membranes. Neck: No anterior/posterior lymphadenopathy noted. No erythema, no masses, no fluctuance or induration noted. No meningeal signs. Cardiovascular: Regular Rate and Rhythm  Respiratory: No acute distress, no rhonchi, wheezing or crackles noted. No stridor or retractions are noted.   Neurological: A&O x4, normal speech  Psychiatric: Cooperative       Testing:     Results for orders placed or performed in visit on 07/18/22   POCT COVID-19, Antigen   Result Value Ref Range    SARS-COV-2, POC Not-Detected Not Detected    Lot Number [id number]     QC Pass/Fail pass     Performing Instrument BD Posiba            Medical Decision Making:     Vital signs reviewed    Past medical history reviewed. Allergies reviewed. Medications reviewed. Patient on arrival does not appear to be in any apparent distress or discomfort. The patient has been seen and evaluated. The patient does not appear to be toxic or lethargic. Patient will not tolerate further vital signs here. Mother notes pulse ox at home of 99%. COVID test here was negative. We will send off PCR. We will hold off on any further medications at this point. The patient was educated on the proper dosage of motrin and tylenol and the appropriate intervals of each. The patient is to increase fluid intake over the next several days. The patient is to use OTC decongestant as needed. The patient is to return to express care or go directly to the emergency department should any of the signs or symptoms worsen. The patient is to followup with primary care physician in 2-3 days for repeat evaluation. The patient has no other questions or concerns at this time the patient will be discharged home. Clinical Impression:   Raguel Bumpers was seen today for positive for covid-19. Diagnoses and all orders for this visit:    Exposure to COVID-19 virus    Suspected COVID-19 virus infection    Cough    Other orders  -     POCT COVID-19, Antigen  -     COVID-19 Ambulatory; Future      The patient is to call for any concerns or return if any of the signs or symptoms worsen. The patient is to follow-up with PCP in the next 2-3 days for repeat evaluation repeat assessment or go directly to the emergency department.      SIGNATURE: Christa Fleischer III, PA-C

## 2022-07-19 LAB — SARS-COV-2, PCR: DETECTED

## 2022-07-19 NOTE — RESULT ENCOUNTER NOTE
COVID-19 was detected as positive. Please have the patient quarantine, isolate. Call with any questions or concerns. Return if any of the signs or symptoms change or worsen for further evaluation and possible imaging/chest x-ray. Continue with vitamin regimen, fluids, rest.  Use Motrin, Tylenol. Monitor pulse ox. Follow-up with PCP or return to [facility] for evaluation.   If symptoms worsen go directly to the ED    Please let me know if they would like to consider the antiviral therapy

## 2022-09-20 ENCOUNTER — OFFICE VISIT (OUTPATIENT)
Dept: PRIMARY CARE CLINIC | Age: 27
End: 2022-09-20
Payer: COMMERCIAL

## 2022-09-20 VITALS — TEMPERATURE: 97.6 F | BODY MASS INDEX: 39.89 KG/M2 | HEART RATE: 80 BPM | WEIGHT: 286 LBS | OXYGEN SATURATION: 96 %

## 2022-09-20 DIAGNOSIS — J40 BRONCHITIS: Primary | ICD-10-CM

## 2022-09-20 DIAGNOSIS — J30.9 ALLERGIC RHINITIS, UNSPECIFIED SEASONALITY, UNSPECIFIED TRIGGER: ICD-10-CM

## 2022-09-20 DIAGNOSIS — F84.0 ACTIVE AUTISTIC DISORDER: ICD-10-CM

## 2022-09-20 PROCEDURE — 99214 OFFICE O/P EST MOD 30 MIN: CPT | Performed by: FAMILY MEDICINE

## 2022-09-20 RX ORDER — AZITHROMYCIN 250 MG/1
TABLET, FILM COATED ORAL
Qty: 6 TABLET | Refills: 0 | Status: SHIPPED | OUTPATIENT
Start: 2022-09-20

## 2022-09-20 ASSESSMENT — PATIENT HEALTH QUESTIONNAIRE - PHQ9
SUM OF ALL RESPONSES TO PHQ QUESTIONS 1-9: 0
SUM OF ALL RESPONSES TO PHQ QUESTIONS 1-9: 0
2. FEELING DOWN, DEPRESSED OR HOPELESS: 0
SUM OF ALL RESPONSES TO PHQ9 QUESTIONS 1 & 2: 0
1. LITTLE INTEREST OR PLEASURE IN DOING THINGS: 0
SUM OF ALL RESPONSES TO PHQ QUESTIONS 1-9: 0
SUM OF ALL RESPONSES TO PHQ QUESTIONS 1-9: 0

## 2022-09-20 NOTE — PROGRESS NOTES
Stephon Sullivan : 1995 Sex: male  Age: 32 y.o. Chief Complaint   Patient presents with    Cough     X 3 weeks    Other     Negative covid home test        HPI  HPI:      Patient presents today with mom with cough for 3 weeks. She states dad would like him checked out. She states that this is allergy season. On Flonase and Zyrtec. Cough seems productive at times. No apparent shortness of breath. Declines blood pressure strep testing COVID testing or otherwise now. States COVID testing at home negative. No fever. Does not otherwise seem ill. No other complaints or concerns. ROS:  As above      Current Outpatient Medications:     azithromycin (ZITHROMAX) 250 MG tablet, 2 po qd  day 1 then 1 po qd  days 2 - 5, Disp: 6 tablet, Rfl: 0    Pyridoxine HCl (VITAMIN B6 PO), Take by mouth, Disp: , Rfl:     fluticasone (FLONASE) 50 MCG/ACT nasal spray, 1 spray each nostril qd prn, Disp: 16 g, Rfl: 5    Multiple Vitamins-Minerals (THERAPEUTIC MULTIVITAMIN-MINERALS) tablet, Take 1 tablet by mouth daily Last dose 2017, Disp: , Rfl:     cetirizine (ZYRTEC) 10 MG tablet, Take 10 mg by mouth daily, Disp: , Rfl:   Allergies   Allergen Reactions    Cefzil [Cefprozil]     Dust Mite Extract     Lac Bovis     Lactose Intolerance (Gi)     Strattera  [Atomoxetine Hcl]        Past Medical History:   Diagnosis Date    Autism     Cerumen impaction     PONV (postoperative nausea and vomiting)      Past Surgical History:   Procedure Laterality Date    EAR EXAM UNDER GENERAL ANESTHESIA Bilateral 2017    evaluation with cerumen removal    MYRINGOTOMY AND TYMPANOSTOMY TUBE PLACEMENT       No family history on file. Social History     Tobacco Use    Smoking status: Never    Smokeless tobacco: Never   Substance Use Topics    Alcohol use: No      Social History     Social History Narrative    PMH:    Medical Problems:    Autism - Has followed with Developmental specialists, Dr Maria D Mancera.  Has tried a number of meds including ritalin. Was part of experimental study for autism (vegetable enzyme) - pt allergic. Has    been to [facility]. Previously  ST/OT. Does not qualify for PT. Nonverbal. Uses [employer] (picture exchange    system)    PDD - pervasive developmental diagnosis    Surgical Hx:    tymp tubes - 4sets. Follows with Dr Duy Jeong. FH:    Father:    . (Hx)    Mother:    . (Hx)    Denies    SH:    . (Marital)Mom, Dad, Sister (3yrs older)    Personal Habits: Cigarette Use: Never Smoked Cigarettes. Vitals:    09/20/22 1042   Pulse: 80   Temp: 97.6 °F (36.4 °C)   SpO2: 96%   Weight: 286 lb (129.7 kg)     Wt Readings from Last 3 Encounters:   09/20/22 286 lb (129.7 kg)   07/18/22 279 lb (126.6 kg)   04/12/22 279 lb 6.4 oz (126.7 kg)        Physical Exam    Exam:  Const: Appears comfortable. No signs of acute distress present. Head/Face: Atraumatic, normocephalic on inspection. Eyes: No discharge from the eyes. Sclerae clear. ENMT: Ears fluid nose boggy mucous membranes, difficult to visualize oropharynx, uncooperative. Neck: Supple. Palpation reveals no adenopathy. No masses appreciated. No JVD. Resp: Respirations are unlabored. Clear to auscultation bilaterally. No rales, rhonchi or wheezes appreciated over the  lungs bilaterally. CV: RRR   Extremities: No clubbing or cyanosis. No edema of the lower limbs  bilaterally. No calf inflammation or tenderness. Abdomen: Abdomen is soft, nontender, and nondistended. No abdominal masses  appreciated. No palpable hepatosplenomegaly. Bowel sounds are normoactive. Skin: Dry and warm with no rash. Muscular skeletal: No acute joint inflammation. Neuro:Grossly intact without focal deficit      Office Labs This Visit :  No results found for this visit on 09/20/22. Assessment and Plan:    1. Bronchitis  Counseled extensively. Differential reviewed, including serious etiologies. Check chest x-ray.   Z-Ricky with precautions including C. difficile, prolonged QT, risk benefits probiotic reviewed. Plain Mucinex as directed. Honey. Proper Hydration. Nasal Saline.  - XR CHEST (2 VW); Future  - azithromycin (ZITHROMAX) 250 MG tablet; 2 po qd  day 1 then 1 po qd  days 2 - 5  Dispense: 6 tablet; Refill: 0    Health maintenance examination  Health maintenance issues discussed at length  4/12/2022. Encourage yearly    Active autistic disorder  History of. Nonverbal.  Needs guardianship. Mom doing an excellent job       Allergic rhinitis  Counseled, does well with Flonase 1 spray each nostril daily as needed and Zyrtec 10 mg daily as needed. (Hold Zyrtec if any thick congestion or evidence of sinusitis)    No problem-specific Assessment & Plan notes found for this encounter. Plan as above. Counseled extensively and differential diagnoses relevant to above were reviewed, including serious etiologies, risks and complications, especially of left uncontrolled. If relevant, instructions and  alternatives to meds/treatment reviewed, as well as interactions, and  SE's/ADRs reviewed, notify immediately if any, discontinuing new meds if any. Plan made after discussion and shared decision making. As above. Chest x-ray. Counseled. Z-Ricky. At this point simply want to follow-up if symptoms persist worsen anyway as previously directed and yearly well checks. As long as symptoms steadily improve/resolve, and medical conditions follow the expected course, FU as below, sooner PRN. Return if symptoms worsen or fail to improve. Educational materials and/or home exercises printed for patient's review and were included in patient instructions on his/her After Visit Summary and given to patient at the end of visit. After discussion, patient and/or guardian verbalizes understanding, agrees, feels comfortable with and wishes to proceed with above treatment plan.  Call for any pending results, FU sooner if abnormal, as needed or if any current symptoms persist/worsen. Advised patient to call with any new medication issues, and read all Rx info from pharmacy to assure aware of all possible risks and side effects of medication before taking. Reviewed age and gender appropriate health screening exams and vaccinations. Advised patient regarding importance of keeping up with recommended health maintenance and to schedule as soon as possible if overdue, as this is important in assessing for undiagnosed pathology, especially cancer, as well as protecting against potentially harmful/life threatening disease. Patient and/or guardian verbalizes understanding and agrees with above counseling, assessment and plan. All questions answered. Signs and symptoms to watch for discussed, serious signs and symptoms reviewed. ER if any. Laurel Guan MD    Patients are advised to check with insurance company to ensure coverage and to fully understand benefits and cost prior to any testing. This note was created with the assistance of voice recognition software. Document was reviewed however may contain grammatical errors.

## 2022-12-16 ENCOUNTER — OFFICE VISIT (OUTPATIENT)
Dept: PRIMARY CARE CLINIC | Age: 27
End: 2022-12-16
Payer: COMMERCIAL

## 2022-12-16 VITALS — OXYGEN SATURATION: 97 % | WEIGHT: 293 LBS | HEART RATE: 79 BPM | TEMPERATURE: 97.2 F | BODY MASS INDEX: 40.87 KG/M2

## 2022-12-16 DIAGNOSIS — H61.23 BILATERAL IMPACTED CERUMEN: ICD-10-CM

## 2022-12-16 DIAGNOSIS — F84.0 ACTIVE AUTISTIC DISORDER: ICD-10-CM

## 2022-12-16 DIAGNOSIS — J30.9 ALLERGIC RHINITIS, UNSPECIFIED SEASONALITY, UNSPECIFIED TRIGGER: ICD-10-CM

## 2022-12-16 DIAGNOSIS — H66.003 NON-RECURRENT ACUTE SUPPURATIVE OTITIS MEDIA OF BOTH EARS WITHOUT SPONTANEOUS RUPTURE OF TYMPANIC MEMBRANES: Primary | ICD-10-CM

## 2022-12-16 DIAGNOSIS — H60.503 ACUTE OTITIS EXTERNA OF BOTH EARS, UNSPECIFIED TYPE: ICD-10-CM

## 2022-12-16 DIAGNOSIS — J06.9 UPPER RESPIRATORY TRACT INFECTION, UNSPECIFIED TYPE: ICD-10-CM

## 2022-12-16 LAB
INFLUENZA A ANTIBODY: NEGATIVE
INFLUENZA B ANTIBODY: NEGATIVE
Lab: NORMAL
PERFORMING INSTRUMENT: NORMAL
QC PASS/FAIL: NORMAL
SARS-COV-2, POC: NORMAL

## 2022-12-16 PROCEDURE — 87804 INFLUENZA ASSAY W/OPTIC: CPT | Performed by: FAMILY MEDICINE

## 2022-12-16 PROCEDURE — 99214 OFFICE O/P EST MOD 30 MIN: CPT | Performed by: FAMILY MEDICINE

## 2022-12-16 PROCEDURE — 87426 SARSCOV CORONAVIRUS AG IA: CPT | Performed by: FAMILY MEDICINE

## 2022-12-16 RX ORDER — AZITHROMYCIN 250 MG/1
TABLET, FILM COATED ORAL
Qty: 6 TABLET | Refills: 0 | Status: SHIPPED | OUTPATIENT
Start: 2022-12-16

## 2022-12-16 RX ORDER — CIPROFLOXACIN AND DEXAMETHASONE 3; 1 MG/ML; MG/ML
SUSPENSION/ DROPS AURICULAR (OTIC)
Qty: 7.5 ML | Refills: 0 | Status: SHIPPED | OUTPATIENT
Start: 2022-12-16

## 2022-12-16 NOTE — PROGRESS NOTES
Atiya Woodbine : 1995 Sex: male  Age: 32 y.o. Chief Complaint   Patient presents with    Otalgia     Right ear x 1 day     Negative covid test at home        HPI  HPI:      Patient presents today with mom and dad for URI symptoms for 2 days. COVID test at home negative. Seems to be complaining of sore throat and right earache. Nonverbal.  Minimal if any cough. Some nasal congestion. No fever. No other complaints or concerns. History of cerumen impaction. Has seen Dr. Candido Jennings in the past.  ROS:  As above      Current Outpatient Medications:     MAGNESIUM PO, Take by mouth, Disp: , Rfl:     MELATONIN PO, Take by mouth, Disp: , Rfl:     azithromycin (ZITHROMAX) 250 MG tablet, 2 po qd  day 1 then 1 po qd  days 2 - 5, Disp: 6 tablet, Rfl: 0    ciprofloxacin-dexamethasone (CIPRODEX) 0.3-0.1 % otic suspension, 4gtts BID to affected ear(s) x 7 days, Disp: 7.5 mL, Rfl: 0    Pyridoxine HCl (VITAMIN B6 PO), Take by mouth, Disp: , Rfl:     fluticasone (FLONASE) 50 MCG/ACT nasal spray, 1 spray each nostril qd prn, Disp: 16 g, Rfl: 5    Multiple Vitamins-Minerals (THERAPEUTIC MULTIVITAMIN-MINERALS) tablet, Take 1 tablet by mouth daily Last dose 2017, Disp: , Rfl:     cetirizine (ZYRTEC) 10 MG tablet, Take 10 mg by mouth daily, Disp: , Rfl:   Allergies   Allergen Reactions    Cefzil [Cefprozil]     Dust Mite Extract     Lac Bovis     Lactose Intolerance (Gi)     Strattera  [Atomoxetine Hcl]        Past Medical History:   Diagnosis Date    Autism     Cerumen impaction     PONV (postoperative nausea and vomiting)      Past Surgical History:   Procedure Laterality Date    EAR EXAM UNDER GENERAL ANESTHESIA Bilateral 2017    evaluation with cerumen removal    MYRINGOTOMY AND TYMPANOSTOMY TUBE PLACEMENT       No family history on file.   Social History     Tobacco Use    Smoking status: Never    Smokeless tobacco: Never   Substance Use Topics    Alcohol use: No      Social History     Social History Narrative    PMH:    Medical Problems:    Autism - Has followed with Developmental specialists, Dr Fernando Flores. Has tried a number of meds    including ritalin. Was part of experimental study for autism (vegetable enzyme) - pt allergic. Has    been to [facility]. Previously  ST/OT. Does not qualify for PT. Nonverbal. Uses [employer] (picture exchange    system)    PDD - pervasive developmental diagnosis    Surgical Hx:    tymp tubes - 4sets. Follows with Dr Candido Jennings. FH:    Father:    . (Hx)    Mother:    . (Hx)    Denies    SH:    . (Marital)Mom, Dad, Sister (3yrs older)    Personal Habits: Cigarette Use: Never Smoked Cigarettes. Vitals:    12/16/22 1415   Pulse: 79   Temp: 97.2 °F (36.2 °C)   SpO2: 97%   Weight: 293 lb (132.9 kg)     Wt Readings from Last 3 Encounters:   12/16/22 293 lb (132.9 kg)   09/20/22 286 lb (129.7 kg)   07/18/22 279 lb (126.6 kg)        Physical Exam    Exam:  Const: Appears comfortable. No signs of acute distress present. Head/Face: Atraumatic, normocephalic on inspection. Eyes: No discharge from the eyes. Sclerae clear. ENMT: Ears shows ear canal inflation some cerumen and visualized TM erythematous, nose boggy mucous membranes, difficult to visualize oropharynx, uncooperative. Neck: Supple. Palpation reveals no adenopathy. No masses appreciated. No JVD. Resp: Respirations are unlabored. Clear to auscultation bilaterally. No rales, rhonchi or wheezes appreciated over the  lungs bilaterally. CV: RRR   Extremities: No clubbing or cyanosis. No edema of the lower limbs  bilaterally. No calf inflammation or tenderness. Abdomen: Abdomen is soft, nontender, and nondistended. No abdominal masses  appreciated. No palpable hepatosplenomegaly. Bowel sounds are normoactive. Skin: Dry and warm with no rash. Muscular skeletal: No acute joint inflammation.   Neuro:Grossly intact without focal deficit      Office Labs This Visit :  Results for orders placed or performed in visit on 12/16/22   POCT COVID-19, Antigen   Result Value Ref Range    SARS-COV-2, POC Not-Detected Not Detected    Lot Number [id number]     QC Pass/Fail pass     Performing Instrument BD Veritor    POCT Influenza A/B   Result Value Ref Range    Influenza A Ab negative     Influenza B Ab negative                     Assessment and Plan:    1. Upper respiratory tract infection, unspecified type  Counseled extensively. Differential reviewed, including serious etiologies. Flu and COVID testing negative but risk of false negative reviewed. Minimal symptoms now other than ear, treat as below. Watch closely for further development of symptoms, return if any issues. Signs and symptoms watch were reviewed. Counseled on plain Mucinex as directed as needed, honey, proper hydration, nasal saline  - POCT COVID-19, Antigen  - POCT Influenza A/B    2. Non-recurrent acute suppurative otitis media of both ears without spontaneous rupture of tympanic membranes  Counseled extensively. Differential reviewed, including serious etiologies. Z-Ricky as directed with precautions including prolonged QT, limitations, interactions and C. difficile. Risk benefits of probiotic reviewed. 3. Acute otitis externa of both ears, unspecified type  Ciprodex as directed. Bilateral impacted cerumen  They do use Debrox at times, Ciprodex as above. Recheck 3 weeks. If persist will need to see Dr. Zander Rodríguez again      Health maintenance examination  Health maintenance issues discussed at length  4/12/2022. Encourage yearly    Active autistic disorder  History of. Nonverbal.  Needs guardianship. Mom doing an excellent job       Allergic rhinitis  Counseled, does well with Flonase 1 spray each nostril daily as needed and Zyrtec 10 mg daily as needed. (Hold Zyrtec if any thick congestion or evidence of sinusitis)    No problem-specific Assessment & Plan notes found for this encounter. Plan as above.   Counseled extensively and differential diagnoses relevant to above were reviewed, including serious etiologies, risks and complications, especially of left uncontrolled. If relevant, instructions and  alternatives to meds/treatment reviewed, as well as interactions, and  SE's/ADRs reviewed, notify immediately if any, discontinuing new meds if any. Plan made after discussion and shared decision making. As above. Counseled. Michael. Ciprodex. Follow-up 3 weeks sooner as needed        As long as symptoms steadily improve/resolve, and medical conditions follow the expected course, FU as below, sooner PRN. Return in about 3 weeks (around 1/6/2023), or if symptoms worsen or fail to improve. Educational materials and/or home exercises printed for patient's review and were included in patient instructions on his/her After Visit Summary and given to patient at the end of visit. After discussion, patient and/or guardian verbalizes understanding, agrees, feels comfortable with and wishes to proceed with above treatment plan. Call for any pending results, FU sooner if abnormal, as needed or if any current symptoms persist/worsen. Advised patient to call with any new medication issues, and read all Rx info from pharmacy to assure aware of all possible risks and side effects of medication before taking. Reviewed age and gender appropriate health screening exams and vaccinations. Advised patient regarding importance of keeping up with recommended health maintenance and to schedule as soon as possible if overdue, as this is important in assessing for undiagnosed pathology, especially cancer, as well as protecting against potentially harmful/life threatening disease. Patient and/or guardian verbalizes understanding and agrees with above counseling, assessment and plan. All questions answered. Signs and symptoms to watch for discussed, serious signs and symptoms reviewed. ER if any.             Geovanny Cortes MD    Patients are advised to check with insurance company to ensure coverage and to fully understand benefits and cost prior to any testing. This note was created with the assistance of voice recognition software. Document was reviewed however may contain grammatical errors.

## 2022-12-27 DIAGNOSIS — J31.0 CHRONIC RHINITIS: ICD-10-CM

## 2022-12-29 RX ORDER — FLUTICASONE PROPIONATE 50 MCG
SPRAY, SUSPENSION (ML) NASAL
Qty: 16 G | Refills: 5 | Status: SHIPPED | OUTPATIENT
Start: 2022-12-29

## 2022-12-29 NOTE — TELEPHONE ENCOUNTER
Last appointment 12/16. Next appointment 1/6/23.  Last refill on this medication was 4/5/22
OK. Keep FU (with labs if applicable) as directed.
Pt complaining of chest pain and blurry vision with sudden onset today. Pt brought directly to main ER

## 2022-12-31 ENCOUNTER — TELEPHONE (OUTPATIENT)
Dept: PRIMARY CARE CLINIC | Age: 27
End: 2022-12-31

## 2022-12-31 NOTE — TELEPHONE ENCOUNTER
Pharmacy sent correspondence that ciprodex is not covered by insurance.   Will need alternative sent to [place]

## 2023-01-03 RX ORDER — NEOMYCIN SULFATE, POLYMYXIN B SULFATE AND HYDROCORTISONE 10; 3.5; 1 MG/ML; MG/ML; [USP'U]/ML
SUSPENSION/ DROPS AURICULAR (OTIC)
Qty: 10 ML | Refills: 0 | Status: SHIPPED
Start: 2023-01-03 | End: 2023-01-06

## 2023-01-06 ENCOUNTER — OFFICE VISIT (OUTPATIENT)
Dept: PRIMARY CARE CLINIC | Age: 28
End: 2023-01-06
Payer: COMMERCIAL

## 2023-01-06 VITALS — BODY MASS INDEX: 40.87 KG/M2 | WEIGHT: 293 LBS | TEMPERATURE: 97.8 F

## 2023-01-06 DIAGNOSIS — F84.0 ACTIVE AUTISTIC DISORDER: ICD-10-CM

## 2023-01-06 DIAGNOSIS — H61.23 BILATERAL IMPACTED CERUMEN: ICD-10-CM

## 2023-01-06 DIAGNOSIS — J06.9 UPPER RESPIRATORY TRACT INFECTION, UNSPECIFIED TYPE: Primary | ICD-10-CM

## 2023-01-06 DIAGNOSIS — J30.9 ALLERGIC RHINITIS, UNSPECIFIED SEASONALITY, UNSPECIFIED TRIGGER: ICD-10-CM

## 2023-01-06 PROCEDURE — 99213 OFFICE O/P EST LOW 20 MIN: CPT | Performed by: FAMILY MEDICINE

## 2023-01-06 SDOH — ECONOMIC STABILITY: FOOD INSECURITY: WITHIN THE PAST 12 MONTHS, YOU WORRIED THAT YOUR FOOD WOULD RUN OUT BEFORE YOU GOT MONEY TO BUY MORE.: NEVER TRUE

## 2023-01-06 SDOH — ECONOMIC STABILITY: FOOD INSECURITY: WITHIN THE PAST 12 MONTHS, THE FOOD YOU BOUGHT JUST DIDN'T LAST AND YOU DIDN'T HAVE MONEY TO GET MORE.: NEVER TRUE

## 2023-01-06 ASSESSMENT — PATIENT HEALTH QUESTIONNAIRE - PHQ9: DEPRESSION UNABLE TO ASSESS: FUNCTIONAL CAPACITY MOTIVATION LIMITS ACCURACY

## 2023-01-06 ASSESSMENT — SOCIAL DETERMINANTS OF HEALTH (SDOH): HOW HARD IS IT FOR YOU TO PAY FOR THE VERY BASICS LIKE FOOD, HOUSING, MEDICAL CARE, AND HEATING?: NOT HARD AT ALL

## 2023-01-06 NOTE — PROGRESS NOTES
Ray Ghotra : 1995 Sex: male  Age: 32 y.o. Chief Complaint   Patient presents with    Otitis Media       HPI  HPI:      Patient presents today with mom for follow-up. Was never able to get the drops. Symptoms resolved. Using some Debrox drops over-the-counter. Has seen Dr. Anjel Garcia in the past for cerumen extraction but is needed sedated because he will not allow them to do it otherwise    ROS:  As above      Current Outpatient Medications:     fluticasone (FLONASE) 50 MCG/ACT nasal spray, 1 spray each nostril qd prn, Disp: 16 g, Rfl: 5    MAGNESIUM PO, Take by mouth, Disp: , Rfl:     MELATONIN PO, Take by mouth, Disp: , Rfl:     Pyridoxine HCl (VITAMIN B6 PO), Take by mouth, Disp: , Rfl:     Multiple Vitamins-Minerals (THERAPEUTIC MULTIVITAMIN-MINERALS) tablet, Take 1 tablet by mouth daily Last dose 2017, Disp: , Rfl:     cetirizine (ZYRTEC) 10 MG tablet, Take 10 mg by mouth daily, Disp: , Rfl:   Allergies   Allergen Reactions    Cefzil [Cefprozil]     Dust Mite Extract     Lac Bovis     Lactose Intolerance (Gi)     Strattera  [Atomoxetine Hcl]        Past Medical History:   Diagnosis Date    Autism     Cerumen impaction     PONV (postoperative nausea and vomiting)      Past Surgical History:   Procedure Laterality Date    EAR EXAM UNDER GENERAL ANESTHESIA Bilateral 2017    evaluation with cerumen removal    MYRINGOTOMY AND TYMPANOSTOMY TUBE PLACEMENT       No family history on file. Social History     Tobacco Use    Smoking status: Never    Smokeless tobacco: Never   Substance Use Topics    Alcohol use: No      Social History     Social History Narrative    PMH:    Medical Problems:    Autism - Has followed with Developmental specialists, Dr Cathy Christie. Has tried a number of meds    including ritalin. Was part of experimental study for autism (vegetable enzyme) - pt allergic. Has    been to [facility]. Previously  ST/OT. Does not qualify for PT.  Nonverbal. Uses PEC (picture exchange system)    PDD - pervasive developmental diagnosis    Surgical Hx:    tymp tubes - 4sets. Follows with Dr Bonner Sever. FH:    Father:    . (Hx)    Mother:    . (Hx)    Denies    SH:    . (Marital)Mom, Dad, Sister (3yrs older)    Personal Habits: Cigarette Use: Never Smoked Cigarettes. Vitals:    01/06/23 1401   Temp: 97.8 °F (36.6 °C)   Weight: 293 lb (132.9 kg)     Wt Readings from Last 3 Encounters:   01/06/23 293 lb (132.9 kg)   12/16/22 293 lb (132.9 kg)   09/20/22 286 lb (129.7 kg)        Physical Exam    Exam:  Const: Appears comfortable. No signs of acute distress present. Nonverbal  Head/Face: Atraumatic, normocephalic on inspection. Eyes: No discharge from the eyes. Sclerae clear. ENMT: Ears shows bilateral cerumen, nose clear oropharynx clear  Neck: Supple. Palpation reveals no adenopathy. No masses appreciated. No JVD. Resp: Respirations are unlabored. Clear to auscultation bilaterally. No rales, rhonchi or wheezes appreciated over the  lungs bilaterally. CV: RRR   Extremities: No clubbing or cyanosis. No edema of the lower limbs  bilaterally. No calf inflammation or tenderness. Abdomen: Abdomen is soft, nontender, and nondistended. No abdominal masses  appreciated. No palpable hepatosplenomegaly. Bowel sounds are normoactive. Skin: Dry and warm with no rash. Muscular skeletal: No acute joint inflammation. Neuro:Grossly intact without focal deficit      Office Labs This Visit :  No results found for this visit on 01/06/23. Assessment and Plan:    1. Upper respiratory tract infection, unspecified type  Resolved, status post Z-Ricky      Bilateral impacted cerumen  Currently using Debrox. Counseled on appropriate use and max 4 days. We did discuss appropriate use of bulb irrigation over-the-counter as needed. They have seen Dr. Bonner Sever in the past but they need to sedate him and mom states he does not tolerate sedation well. He is asymptomatic.   They will let us know if any symptoms and see Dr. Roma Vasquez as needed    Health maintenance examination  Health maintenance issues discussed at length  4/12/2022. Encourage yearly    Active autistic disorder  History of. Nonverbal.  Needs guardianship. Mom doing an excellent job       Allergic rhinitis  Counseled, does well with Flonase 1 spray each nostril daily as needed and Zyrtec 10 mg daily as needed. (Hold Zyrtec if any thick congestion or evidence of sinusitis)    No problem-specific Assessment & Plan notes found for this encounter. Plan as above. Counseled extensively and differential diagnoses relevant to above were reviewed, including serious etiologies, risks and complications, especially of left uncontrolled. If relevant, instructions and  alternatives to meds/treatment reviewed, as well as interactions, and  SE's/ADRs reviewed, notify immediately if any, discontinuing new meds if any. Plan made after discussion and shared decision making. As above. Counseled. Dr. Roma Vasquez as needed for cerumen impaction, OTC remedies as above, otherwise plan and follow-up April physical sooner as needed      As long as symptoms steadily improve/resolve, and medical conditions follow the expected course, FU as below, sooner PRN. Return for April physical sooner as needed. Educational materials and/or home exercises printed for patient's review and were included in patient instructions on his/her After Visit Summary and given to patient at the end of visit. After discussion, patient and/or guardian verbalizes understanding, agrees, feels comfortable with and wishes to proceed with above treatment plan. Call for any pending results, FU sooner if abnormal, as needed or if any current symptoms persist/worsen. Advised patient to call with any new medication issues, and read all Rx info from pharmacy to assure aware of all possible risks and side effects of medication before taking.      Reviewed age and gender appropriate health screening exams and vaccinations. Advised patient regarding importance of keeping up with recommended health maintenance and to schedule as soon as possible if overdue, as this is important in assessing for undiagnosed pathology, especially cancer, as well as protecting against potentially harmful/life threatening disease. Patient and/or guardian verbalizes understanding and agrees with above counseling, assessment and plan. All questions answered. Signs and symptoms to watch for discussed, serious signs and symptoms reviewed. ER if any. Chantal Gerard MD    Patients are advised to check with insurance company to ensure coverage and to fully understand benefits and cost prior to any testing. This note was created with the assistance of voice recognition software. Document was reviewed however may contain grammatical errors.

## 2023-05-09 ENCOUNTER — OFFICE VISIT (OUTPATIENT)
Dept: PRIMARY CARE CLINIC | Age: 28
End: 2023-05-09
Payer: COMMERCIAL

## 2023-05-09 VITALS
WEIGHT: 299 LBS | HEART RATE: 71 BPM | HEIGHT: 71 IN | TEMPERATURE: 97.6 F | OXYGEN SATURATION: 97 % | BODY MASS INDEX: 41.86 KG/M2

## 2023-05-09 DIAGNOSIS — J30.9 ALLERGIC RHINITIS, UNSPECIFIED SEASONALITY, UNSPECIFIED TRIGGER: ICD-10-CM

## 2023-05-09 DIAGNOSIS — Z00.00 HEALTH MAINTENANCE EXAMINATION: Primary | ICD-10-CM

## 2023-05-09 DIAGNOSIS — H61.23 BILATERAL IMPACTED CERUMEN: ICD-10-CM

## 2023-05-09 DIAGNOSIS — E66.01 OBESITY, CLASS III, BMI 40-49.9 (MORBID OBESITY) (HCC): ICD-10-CM

## 2023-05-09 DIAGNOSIS — F84.0 ACTIVE AUTISTIC DISORDER: ICD-10-CM

## 2023-05-09 PROCEDURE — 99395 PREV VISIT EST AGE 18-39: CPT | Performed by: FAMILY MEDICINE

## 2023-05-09 SDOH — ECONOMIC STABILITY: FOOD INSECURITY: WITHIN THE PAST 12 MONTHS, THE FOOD YOU BOUGHT JUST DIDN'T LAST AND YOU DIDN'T HAVE MONEY TO GET MORE.: NEVER TRUE

## 2023-05-09 SDOH — ECONOMIC STABILITY: HOUSING INSECURITY
IN THE LAST 12 MONTHS, WAS THERE A TIME WHEN YOU DID NOT HAVE A STEADY PLACE TO SLEEP OR SLEPT IN A SHELTER (INCLUDING NOW)?: NO

## 2023-05-09 SDOH — ECONOMIC STABILITY: INCOME INSECURITY: HOW HARD IS IT FOR YOU TO PAY FOR THE VERY BASICS LIKE FOOD, HOUSING, MEDICAL CARE, AND HEATING?: NOT HARD AT ALL

## 2023-05-09 SDOH — ECONOMIC STABILITY: FOOD INSECURITY: WITHIN THE PAST 12 MONTHS, YOU WORRIED THAT YOUR FOOD WOULD RUN OUT BEFORE YOU GOT MONEY TO BUY MORE.: NEVER TRUE

## 2023-05-09 ASSESSMENT — PATIENT HEALTH QUESTIONNAIRE - PHQ9
SUM OF ALL RESPONSES TO PHQ QUESTIONS 1-9: 0
SUM OF ALL RESPONSES TO PHQ QUESTIONS 1-9: 0
1. LITTLE INTEREST OR PLEASURE IN DOING THINGS: 0
SUM OF ALL RESPONSES TO PHQ QUESTIONS 1-9: 0
2. FEELING DOWN, DEPRESSED OR HOPELESS: 0
SUM OF ALL RESPONSES TO PHQ9 QUESTIONS 1 & 2: 0
SUM OF ALL RESPONSES TO PHQ QUESTIONS 1-9: 0

## 2023-05-09 NOTE — PROGRESS NOTES
Miley Chao : 1995 Sex: male  Age: 32 y.o. Chief Complaint   Patient presents with    Annual Exam    Health Maintenance    Allergies       HPI:   Jose Hinton presents today with his mom/guardian. In baseline state of health. Has forms to be completed. Chronic cerumen. Has seen ENT. Defers office procedure. Typically need sedated even for Dr. Matt Mathis. Going to try Debrox and gentle irrigation at home. Unable to do visual exam.  Discussed seeing eye doctor. Mom defers immunizations, has not had any since 21 months of age. Defers blood work. ROS: Negative in general, nonverbal  Const: Denies chills, fever, malaise and sweats. Eyes: Denies discharge, pain, redness and visual disturbance. ENMT: Denies earaches, other ear symptoms. Denies nasal or sinus symptoms other than stated  above. Denies mouth and tongue lesions and sore throat. CV: Denies chest discomfort, pain; diaphoresis, dizziness, edema, lightheadedness, orthopnea,  palpitations, syncope and near syncopal episode or any exertional symptoms  Resp: Denies cough, hemoptysis, pleuritic pain, SOB, sputum production and wheezing. GI: Denies abdominal pain, change in bowel habits, hematochezia, melena, nausea and vomiting. : Denies urinary symptoms including dysuria , urgency, frequency or hematuria. Musculo: Denies musculoskeletal symptoms. Skin: Denies bruising and rash. Neuro: Denies headache, numbness, stiff neck, tingling and focal weakness slurred speech or facial  droop  Hema/Lymph: Denies bleeding/bruising tendency and enlarged lymph nodes      Health Maintenance:  Proper diet reviewed including Mediterranean and DASH diets. Counseled on healthy weight, appropriate exercise. Counseled on the potential pros and cons of vitamins and supplements.   Reviewed the recommendations and risk/benefits of vaccines including Td/Tdap,  pneumovax,  prevnar 13 , flu vaccine, Hepatitis vaccines, gardasil, and shingrix (patient had

## 2023-09-26 ENCOUNTER — TELEPHONE (OUTPATIENT)
Dept: PRIMARY CARE CLINIC | Age: 28
End: 2023-09-26
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
## 2023-09-26 NOTE — TELEPHONE ENCOUNTER
Unlikely aspirated if no symptoms, will watch for symptoms of infection, fever cough etc.  But always safest to be seen if they would like.   Chest x-ray and option

## 2023-09-27 ENCOUNTER — OFFICE VISIT (OUTPATIENT)
Dept: PRIMARY CARE CLINIC | Age: 28
End: 2023-09-27
Payer: COMMERCIAL

## 2023-09-27 VITALS — WEIGHT: 307 LBS | BODY MASS INDEX: 42.82 KG/M2 | OXYGEN SATURATION: 98 % | TEMPERATURE: 97.8 F | HEART RATE: 98 BPM

## 2023-09-27 DIAGNOSIS — E66.01 OBESITY, CLASS III, BMI 40-49.9 (MORBID OBESITY) (HCC): ICD-10-CM

## 2023-09-27 DIAGNOSIS — H61.23 BILATERAL IMPACTED CERUMEN: ICD-10-CM

## 2023-09-27 DIAGNOSIS — Z00.00 HEALTH MAINTENANCE EXAMINATION: ICD-10-CM

## 2023-09-27 DIAGNOSIS — T17.320A CHOKING DUE TO FOOD IN LARYNX, INITIAL ENCOUNTER: Primary | ICD-10-CM

## 2023-09-27 DIAGNOSIS — J30.9 ALLERGIC RHINITIS, UNSPECIFIED SEASONALITY, UNSPECIFIED TRIGGER: ICD-10-CM

## 2023-09-27 DIAGNOSIS — F84.0 ACTIVE AUTISTIC DISORDER: ICD-10-CM

## 2023-09-27 PROCEDURE — 99214 OFFICE O/P EST MOD 30 MIN: CPT | Performed by: FAMILY MEDICINE

## 2023-09-27 NOTE — PROGRESS NOTES
Alton Del Valle : 1995 Sex: male  Age: 32 y.o. Chief Complaint   Patient presents with    Aspiration     Choked on a piece of hot dog yesterday; would like xray ordered        HPI:   Jael Rios presents today with his mom. Yesterday she was called from workshop, was told that he \"choked\" on a hot dog, they patted on the back a few times and it came up. He was absolutely asymptomatic after that. An hour later they told her they were going to call EMS because this was protocol although asymptomatic. Mom felt this would upset him. They did not call. They present today. Completely asymptomatic. No cough or notable congestion, no fever rigors, acting in baseline health. Risk of aspiration ammonia reviewed. Again completely symptomatic. We discussed the risk of future aspiration ammonia, risk-benefit of imaging risk-benefit of prophylactic antibiotic-ultimately defers now unless develops symptoms. He continues to eat yesterday without event. Mom states she knows bad and does not know that he truly was choking. ROS: Negative in general, nonverbal  Const: Denies chills, fever, malaise and sweats. Eyes: Denies discharge, pain, redness and visual disturbance. ENMT: Denies earaches, other ear symptoms. Denies nasal or sinus symptoms other than stated  above. Denies mouth and tongue lesions and sore throat. CV: Denies chest discomfort, pain; diaphoresis, dizziness, edema, lightheadedness, orthopnea,  palpitations, syncope and near syncopal episode or any exertional symptoms  Resp: Denies cough, hemoptysis, pleuritic pain, SOB, sputum production and wheezing. GI: Denies abdominal pain, change in bowel habits, hematochezia, melena, nausea and vomiting. : Denies urinary symptoms including dysuria , urgency, frequency or hematuria. Musculo: Denies musculoskeletal symptoms. Skin: Denies bruising and rash.   Neuro: Denies headache, numbness, stiff neck, tingling and focal weakness slurred

## 2023-12-02 DIAGNOSIS — J31.0 CHRONIC RHINITIS: ICD-10-CM

## 2023-12-04 RX ORDER — FLUTICASONE PROPIONATE 50 MCG
SPRAY, SUSPENSION (ML) NASAL
Qty: 16 G | Refills: 5 | Status: SHIPPED | OUTPATIENT
Start: 2023-12-04

## 2023-12-26 ENCOUNTER — OFFICE VISIT (OUTPATIENT)
Dept: FAMILY MEDICINE CLINIC | Age: 28
End: 2023-12-26
Payer: COMMERCIAL

## 2023-12-26 VITALS
WEIGHT: 300 LBS | BODY MASS INDEX: 42 KG/M2 | TEMPERATURE: 97.8 F | OXYGEN SATURATION: 94 % | RESPIRATION RATE: 18 BRPM | HEIGHT: 71 IN | HEART RATE: 107 BPM

## 2023-12-26 DIAGNOSIS — J10.1 INFLUENZA A: ICD-10-CM

## 2023-12-26 DIAGNOSIS — R05.9 COUGH, UNSPECIFIED TYPE: Primary | ICD-10-CM

## 2023-12-26 LAB
INFLUENZA A ANTIBODY: ABNORMAL
INFLUENZA B ANTIBODY: ABNORMAL
Lab: NORMAL
PERFORMING INSTRUMENT: NORMAL
QC PASS/FAIL: NORMAL
SARS-COV-2, POC: NORMAL

## 2023-12-26 PROCEDURE — 87426 SARSCOV CORONAVIRUS AG IA: CPT | Performed by: FAMILY MEDICINE

## 2023-12-26 PROCEDURE — 99213 OFFICE O/P EST LOW 20 MIN: CPT | Performed by: FAMILY MEDICINE

## 2023-12-26 PROCEDURE — 87804 INFLUENZA ASSAY W/OPTIC: CPT | Performed by: FAMILY MEDICINE

## 2023-12-26 RX ORDER — OSELTAMIVIR PHOSPHATE 75 MG/1
75 CAPSULE ORAL 2 TIMES DAILY
Qty: 10 CAPSULE | Refills: 0 | Status: SHIPPED | OUTPATIENT
Start: 2023-12-26 | End: 2023-12-31

## 2023-12-26 RX ORDER — BENZONATATE 100 MG/1
100 CAPSULE ORAL 3 TIMES DAILY PRN
Qty: 30 CAPSULE | Refills: 0 | Status: SHIPPED | OUTPATIENT
Start: 2023-12-26 | End: 2024-01-02

## 2023-12-26 NOTE — PROGRESS NOTES
Chief Complaint:   Chief Complaint   Patient presents with    Cough     For last 3-4 days. Tried OTC meds. Congestion       URI   This is a new problem. The current episode started in the past 7 days. The problem has been gradually worsening. Associated symptoms include congestion, coughing and rhinorrhea. Pertinent negatives include no abdominal pain, chest pain, diarrhea, headaches, nausea, rash, sore throat, vomiting or wheezing. He has tried nothing for the symptoms. Patient Active Problem List   Diagnosis    Active autistic disorder    Bilateral impacted cerumen    Chronic rhinitis       Past Medical History:   Diagnosis Date    Allergic rhinitis     Autism     Cerumen impaction     PONV (postoperative nausea and vomiting)        Past Surgical History:   Procedure Laterality Date    EAR EXAM UNDER GENERAL ANESTHESIA Bilateral 08/24/2017    evaluation with cerumen removal    MYRINGOTOMY AND TYMPANOSTOMY TUBE PLACEMENT         Current Outpatient Medications   Medication Sig Dispense Refill    oseltamivir (TAMIFLU) 75 MG capsule Take 1 capsule by mouth 2 times daily for 5 days 10 capsule 0    benzonatate (TESSALON) 100 MG capsule Take 1 capsule by mouth 3 times daily as needed for Cough 30 capsule 0    fluticasone (FLONASE) 50 MCG/ACT nasal spray 1 spray each nostril qd prn 16 g 5    VITAMIN D PO Take by mouth      MAGNESIUM PO Take by mouth      MELATONIN PO Take by mouth      Pyridoxine HCl (VITAMIN B6 PO) Take by mouth      Multiple Vitamins-Minerals (THERAPEUTIC MULTIVITAMIN-MINERALS) tablet Take 1 tablet by mouth daily Last dose 8/14/2017      cetirizine (ZYRTEC) 10 MG tablet Take 1 tablet by mouth daily       No current facility-administered medications for this visit.        Allergies   Allergen Reactions    Cefzil [Cefprozil]     Dust Mite Extract     Lactose Intolerance (Gi)     Milk (Cow)     Strattera  [Atomoxetine Hcl]        Social History     Socioeconomic History    Marital status: Single

## 2024-05-08 ENCOUNTER — OFFICE VISIT (OUTPATIENT)
Dept: PRIMARY CARE CLINIC | Age: 29
End: 2024-05-08
Payer: COMMERCIAL

## 2024-05-08 VITALS — WEIGHT: 315 LBS | HEART RATE: 100 BPM | OXYGEN SATURATION: 93 % | TEMPERATURE: 98 F | BODY MASS INDEX: 43.93 KG/M2

## 2024-05-08 DIAGNOSIS — Z00.00 HEALTH MAINTENANCE EXAMINATION: Primary | ICD-10-CM

## 2024-05-08 DIAGNOSIS — F84.0 ACTIVE AUTISTIC DISORDER: ICD-10-CM

## 2024-05-08 DIAGNOSIS — G47.00 INSOMNIA, UNSPECIFIED TYPE: ICD-10-CM

## 2024-05-08 DIAGNOSIS — H61.23 BILATERAL IMPACTED CERUMEN: ICD-10-CM

## 2024-05-08 DIAGNOSIS — E66.01 OBESITY, CLASS III, BMI 40-49.9 (MORBID OBESITY) (HCC): ICD-10-CM

## 2024-05-08 DIAGNOSIS — J31.0 CHRONIC RHINITIS: ICD-10-CM

## 2024-05-08 PROCEDURE — 99213 OFFICE O/P EST LOW 20 MIN: CPT | Performed by: FAMILY MEDICINE

## 2024-05-08 PROCEDURE — 99395 PREV VISIT EST AGE 18-39: CPT | Performed by: FAMILY MEDICINE

## 2024-05-08 RX ORDER — HYDROXYZINE 50 MG/1
25-50 TABLET, FILM COATED ORAL
Qty: 30 TABLET | Refills: 3 | Status: SHIPPED | OUTPATIENT
Start: 2024-05-08

## 2024-05-08 RX ORDER — ASCORBIC ACID 500 MG
500 TABLET ORAL DAILY
COMMUNITY

## 2024-05-08 ASSESSMENT — PATIENT HEALTH QUESTIONNAIRE - PHQ9
SUM OF ALL RESPONSES TO PHQ9 QUESTIONS 1 & 2: 0
SUM OF ALL RESPONSES TO PHQ QUESTIONS 1-9: 0
1. LITTLE INTEREST OR PLEASURE IN DOING THINGS: NOT AT ALL
SUM OF ALL RESPONSES TO PHQ QUESTIONS 1-9: 0
2. FEELING DOWN, DEPRESSED OR HOPELESS: NOT AT ALL
SUM OF ALL RESPONSES TO PHQ QUESTIONS 1-9: 0
SUM OF ALL RESPONSES TO PHQ QUESTIONS 1-9: 0

## 2024-07-05 ENCOUNTER — OFFICE VISIT (OUTPATIENT)
Dept: FAMILY MEDICINE CLINIC | Age: 29
End: 2024-07-05
Payer: COMMERCIAL

## 2024-07-05 VITALS
HEIGHT: 71 IN | OXYGEN SATURATION: 97 % | WEIGHT: 315 LBS | HEART RATE: 108 BPM | TEMPERATURE: 98.1 F | BODY MASS INDEX: 44.1 KG/M2

## 2024-07-05 DIAGNOSIS — L03.115 CELLULITIS OF RIGHT LEG: Primary | ICD-10-CM

## 2024-07-05 PROCEDURE — 99213 OFFICE O/P EST LOW 20 MIN: CPT | Performed by: FAMILY MEDICINE

## 2024-07-05 RX ORDER — DOXYCYCLINE 100 MG/1
100 TABLET ORAL 2 TIMES DAILY
Qty: 20 TABLET | Refills: 0 | Status: SHIPPED | OUTPATIENT
Start: 2024-07-05 | End: 2024-07-15

## 2024-07-05 RX ORDER — DIPHENHYDRAMINE HCL 25 MG
25 CAPSULE ORAL NIGHTLY PRN
COMMUNITY

## 2024-07-05 ASSESSMENT — ENCOUNTER SYMPTOMS
COLOR CHANGE: 1
PHOTOPHOBIA: 0
DIARRHEA: 0
CONSTIPATION: 0
VOMITING: 0
ABDOMINAL PAIN: 0
BLOOD IN STOOL: 0
SORE THROAT: 0
COUGH: 0
BACK PAIN: 0
SHORTNESS OF BREATH: 0
NAUSEA: 0

## 2024-07-05 NOTE — PROGRESS NOTES
Alexei Gabriel (:  1995) is a 28 y.o. male,Established patient, here for evaluation of the following chief complaint(s):  Rash (Right leg, using neosporin since Wednesday. Slight improvement/Thinks bug bite  )      Assessment & Plan   1. Cellulitis of right leg  -     doxycycline monohydrate (ADOXA) 100 MG tablet; Take 1 tablet by mouth 2 times daily for 10 days, Disp-20 tablet, R-0Normal    This time we will treat symptomatically.  Red flags discussed with parents that this occurs they are to take him directly to the nearest emergency department.  Voiced understanding.  Follow-up with PCP in 1 to 2 weeks.  No follow-ups on file.       Subjective   Rash  Pertinent negatives include no congestion, cough, diarrhea, fever, shortness of breath, sore throat or vomiting.     Patient presents today with parents for evaluation of worsening right-sided lower extremity redness and swelling.  They believe he may have been stung by something in the last 24 hours.  They do not remember anything at home however he does go to workshop and may have been stung there.  They state that he is also been scratching the affected region at which time the redness and swelling did worsen.  No noted fevers however the patient is nonverbal I cannot tell him if it is hurting him.      Review of Systems   Constitutional:  Negative for chills and fever.   HENT:  Negative for congestion, hearing loss, nosebleeds and sore throat.    Eyes:  Negative for photophobia.   Respiratory:  Negative for cough and shortness of breath.    Cardiovascular:  Negative for chest pain, palpitations and leg swelling.   Gastrointestinal:  Negative for abdominal pain, blood in stool, constipation, diarrhea, nausea and vomiting.   Endocrine: Negative for polydipsia.   Genitourinary:  Negative for dysuria, frequency, hematuria and urgency.   Musculoskeletal:  Negative for back pain and myalgias.   Skin:  Positive for color change, rash and wound.

## 2024-09-16 ENCOUNTER — OFFICE VISIT (OUTPATIENT)
Dept: PRIMARY CARE CLINIC | Age: 29
End: 2024-09-16
Payer: COMMERCIAL

## 2024-09-16 VITALS — HEIGHT: 71 IN | BODY MASS INDEX: 44.1 KG/M2 | TEMPERATURE: 97.6 F | WEIGHT: 315 LBS | OXYGEN SATURATION: 97 %

## 2024-09-16 DIAGNOSIS — F84.0 ACTIVE AUTISTIC DISORDER: Primary | ICD-10-CM

## 2024-09-16 DIAGNOSIS — E66.01 OBESITY, CLASS III, BMI 40-49.9 (MORBID OBESITY) (HCC): ICD-10-CM

## 2024-09-16 DIAGNOSIS — Z01.818 ENCOUNTER FOR PREOPERATIVE DENTAL EXAMINATION: ICD-10-CM

## 2024-09-16 DIAGNOSIS — F84.9 PDD (PERVASIVE DEVELOPMENTAL DISORDER): ICD-10-CM

## 2024-09-16 PROCEDURE — 99214 OFFICE O/P EST MOD 30 MIN: CPT | Performed by: FAMILY MEDICINE

## 2024-09-16 SDOH — ECONOMIC STABILITY: INCOME INSECURITY: HOW HARD IS IT FOR YOU TO PAY FOR THE VERY BASICS LIKE FOOD, HOUSING, MEDICAL CARE, AND HEATING?: NOT HARD AT ALL

## 2024-09-16 SDOH — ECONOMIC STABILITY: FOOD INSECURITY: WITHIN THE PAST 12 MONTHS, THE FOOD YOU BOUGHT JUST DIDN'T LAST AND YOU DIDN'T HAVE MONEY TO GET MORE.: NEVER TRUE

## 2024-09-16 SDOH — ECONOMIC STABILITY: FOOD INSECURITY: WITHIN THE PAST 12 MONTHS, YOU WORRIED THAT YOUR FOOD WOULD RUN OUT BEFORE YOU GOT MONEY TO BUY MORE.: NEVER TRUE

## 2024-10-14 ENCOUNTER — TELEPHONE (OUTPATIENT)
Dept: PRIMARY CARE CLINIC | Age: 29
End: 2024-10-14

## 2024-10-14 NOTE — TELEPHONE ENCOUNTER
Dental surgery has been changed to 10/28 asking if you could change the date on the form from 9/16 to be within 30 days of the upcoming surgery or do you need to see him for another appt to change this.

## 2024-11-04 ENCOUNTER — TELEPHONE (OUTPATIENT)
Dept: PRIMARY CARE CLINIC | Age: 29
End: 2024-11-04

## 2024-11-04 NOTE — TELEPHONE ENCOUNTER
Spoke with mom and dad.  Recently had dental procedure scheduled for 10/14.  They were trying to give liquid medication when unfortunately he only tolerates pills.  This procedure got canceled/postponed.  Scheduled for last Monday 10/28 where they wanted him to take 10 mg of Valium prior to leaving and had a scheduled repeat this x 3 for total 30 mg.  Mom explained that this has had a paradoxical effect in the past and in fact it did, procedure was ultimately canceled.  Yesterday they are in [place] visiting their daughter/Alexei's sister who is in healthcare I believe RN.  Sister witnessed a seizure, patient was sitting.  They called EMS.  Sounds that he was postictal for about 30 minutes.  EMS offered to take to the hospital but sounds like EMS talked them out of it.  He slowly recovered to baseline and is currently acting baseline.  Unfortunately very difficult to get testing on him terms of imaging blood work etc. nonverbal.  In 2021 had an episode where EMS was called but was felt to be more vasovagal at that time and there is family history of this.  However this episode sounds more consistent with seizure but differential reviewed.  We also discussed the various causes of seizures, including brain neoplasm, seizure disorder, electrolyte imbalance, infection etc.  Risk of subsequent seizure in the potential DF nature of these reviewed.  For various reasons I do recommend workup ASAP through the ER where he can at least get blood work/imaging and recommend neurology consult.  Our limited neurologist in this area reviewed.  I did suggest overnight stay for observation, EEG etc.  After discussion they are agreeable to this and going to take him to [facility] ER for further evaluation and treatment.  They will follow-up afterward.

## 2024-12-07 DIAGNOSIS — J31.0 CHRONIC RHINITIS: ICD-10-CM

## 2024-12-09 RX ORDER — FLUTICASONE PROPIONATE 50 MCG
SPRAY, SUSPENSION (ML) NASAL
Qty: 16 G | Refills: 5 | Status: SHIPPED | OUTPATIENT
Start: 2024-12-09

## 2025-02-03 ENCOUNTER — TELEPHONE (OUTPATIENT)
Dept: PRIMARY CARE CLINIC | Age: 30
End: 2025-02-03

## 2025-02-03 NOTE — TELEPHONE ENCOUNTER
Needs excuse for jury duty, he is non verbal, autistic    Email to : [email or web address]

## 2025-02-13 ENCOUNTER — OFFICE VISIT (OUTPATIENT)
Dept: FAMILY MEDICINE CLINIC | Age: 30
End: 2025-02-13
Payer: COMMERCIAL

## 2025-02-13 VITALS — WEIGHT: 315 LBS | BODY MASS INDEX: 44.77 KG/M2 | HEART RATE: 86 BPM | TEMPERATURE: 97.1 F | OXYGEN SATURATION: 95 %

## 2025-02-13 DIAGNOSIS — H61.23 BILATERAL IMPACTED CERUMEN: Primary | ICD-10-CM

## 2025-02-13 DIAGNOSIS — R09.81 NASAL CONGESTION: ICD-10-CM

## 2025-02-13 DIAGNOSIS — R09.82 POSTNASAL DRIP: ICD-10-CM

## 2025-02-13 DIAGNOSIS — J01.90 ACUTE NON-RECURRENT SINUSITIS, UNSPECIFIED LOCATION: ICD-10-CM

## 2025-02-13 PROCEDURE — 99213 OFFICE O/P EST LOW 20 MIN: CPT | Performed by: PHYSICIAN ASSISTANT

## 2025-02-13 RX ORDER — AZITHROMYCIN 250 MG/1
TABLET, FILM COATED ORAL
Qty: 6 TABLET | Refills: 0 | Status: SHIPPED | OUTPATIENT
Start: 2025-02-13 | End: 2025-02-23

## 2025-02-13 NOTE — PROGRESS NOTES
25  Alexei Gabriel : 1995 Sex: male  Age 29 y.o.      Subjective:  Chief Complaint   Patient presents with    Congestion         HPI:     History of Present Illness  The patient is a 29-year-old male who presents for evaluation of sinus congestion.    History is reported by other person in the presence of the patient.  He has been experiencing persistent sinus congestion, which has led to him exhibiting self-injurious behavior such as hitting his head. He has been administered ibuprofen as a palliative measure. It is noteworthy that he has a history of similar behaviors during episodes of ear pain. He has not exhibited any systemic symptoms such as fever or chills. His respiratory symptoms are limited to mild coughing, with no reported difficulty in breathing. He does not appear to be experiencing any throat discomfort, even during swallowing or eating. He has been producing greenish nasal discharge. He has been administered ibuprofen as a palliative measure. He has been using Debrox and Flonase as part of his treatment regimen.    ALLERGIES  The patient has had an allergic reaction to CEFTIN and STRATTERA, resulting in hives.    MEDICATIONS  Current: ibuprofen, Debrox, Flonase            ROS:   Unless otherwise stated in this report the patient's positive and negative responses for review of systems for constitutional, eyes, ENT, cardiovascular, respiratory, gastrointestinal, neurological, , musculoskeletal, and integument systems and related systems to the presenting problem are either stated in the history of present illness or were not pertinent or were negative for the symptoms and/or complaints related to the presenting medical problem.  Positives and pertinent negatives as per HPI.  All others reviewed and are negative.      PMH:     Past Medical History:   Diagnosis Date    Allergic rhinitis     Autism     Cerumen impaction     PONV (postoperative nausea and vomiting)        Past

## 2025-05-15 ENCOUNTER — TELEPHONE (OUTPATIENT)
Dept: PRIMARY CARE CLINIC | Age: 30
End: 2025-05-15

## 2025-05-15 NOTE — TELEPHONE ENCOUNTER
Mom called and asked if she could drop off paper work for day program - saying it's ok for them to apply sun screen.  If ok please call mom and let her know.

## 2025-06-10 ENCOUNTER — OFFICE VISIT (OUTPATIENT)
Dept: FAMILY MEDICINE CLINIC | Age: 30
End: 2025-06-10

## 2025-06-10 VITALS
BODY MASS INDEX: 44.1 KG/M2 | RESPIRATION RATE: 18 BRPM | TEMPERATURE: 98.7 F | HEIGHT: 71 IN | WEIGHT: 315 LBS | HEART RATE: 92 BPM | OXYGEN SATURATION: 97 %

## 2025-06-10 DIAGNOSIS — J06.9 URI WITH COUGH AND CONGESTION: Primary | ICD-10-CM

## 2025-06-10 NOTE — PROGRESS NOTES
interpreted by Radiologist unless otherwise noted.  No orders to display       Assessment / Plan   Alexei \"Mike\" was seen today for congestion and cough.    Diagnoses and all orders for this visit:    URI with cough and congestion    Disposition:  Disposition: Discharge to home    Discussed with the parent that this is likely a viral infection and will resolve with current conservative measures.  Antibiotic stewardship discussed. Increase fluids and rest.  Sleep with the head of the bed elevated.  Coolmist humidifier. Can take OTC cough medications to help if needed. Follow-up with PCP if needed. Red flag symptoms were discussed with the patient including high or refractory fever, progressive SOB, dyspnea, CP, calf pain/swelling, shaking chills, vomiting, abdominal pain, lethargy, flank pain, or decreased urinary output.  If any of these occur, they should go immediately to the emergency department for further evaluation.  All questions were answered.  The parent relies understanding and was agreeable to the above plan.    JUAN JOSÉ Coronado CNP    The patient (or guardian, if applicable) and other individuals in attendance with the patient were advised that Artificial Intelligence will be utilized during this visit to record, process the conversation to generate a clinical note, and support improvement of the AI technology. The patient (or guardian, if applicable) and other individuals in attendance at the appointment consented to the use of AI, including the recording.      Contains texts from [employer] (if applicable)